# Patient Record
Sex: FEMALE | Race: WHITE | NOT HISPANIC OR LATINO | Employment: FULL TIME | URBAN - NONMETROPOLITAN AREA
[De-identification: names, ages, dates, MRNs, and addresses within clinical notes are randomized per-mention and may not be internally consistent; named-entity substitution may affect disease eponyms.]

---

## 2022-06-03 ENCOUNTER — LAB (OUTPATIENT)
Dept: LAB | Facility: HOSPITAL | Age: 42
End: 2022-06-03

## 2022-06-03 ENCOUNTER — OFFICE VISIT (OUTPATIENT)
Dept: GASTROENTEROLOGY | Facility: CLINIC | Age: 42
End: 2022-06-03

## 2022-06-03 VITALS
SYSTOLIC BLOOD PRESSURE: 127 MMHG | HEART RATE: 80 BPM | BODY MASS INDEX: 24.8 KG/M2 | HEIGHT: 67 IN | WEIGHT: 158 LBS | OXYGEN SATURATION: 98 % | DIASTOLIC BLOOD PRESSURE: 79 MMHG

## 2022-06-03 DIAGNOSIS — K21.9 GASTROESOPHAGEAL REFLUX DISEASE WITHOUT ESOPHAGITIS: ICD-10-CM

## 2022-06-03 DIAGNOSIS — R11.2 NAUSEA AND VOMITING, UNSPECIFIED VOMITING TYPE: ICD-10-CM

## 2022-06-03 DIAGNOSIS — R19.7 DIARRHEA, UNSPECIFIED TYPE: ICD-10-CM

## 2022-06-03 DIAGNOSIS — K21.9 GASTROESOPHAGEAL REFLUX DISEASE WITHOUT ESOPHAGITIS: Primary | ICD-10-CM

## 2022-06-03 DIAGNOSIS — R10.84 GENERALIZED ABDOMINAL PAIN: Primary | ICD-10-CM

## 2022-06-03 DIAGNOSIS — R10.84 GENERALIZED ABDOMINAL PAIN: ICD-10-CM

## 2022-06-03 LAB — SARS-COV-2 RNA PNL SPEC NAA+PROBE: NOT DETECTED

## 2022-06-03 PROCEDURE — 99204 OFFICE O/P NEW MOD 45 MIN: CPT | Performed by: PHYSICIAN ASSISTANT

## 2022-06-03 PROCEDURE — U0004 COV-19 TEST NON-CDC HGH THRU: HCPCS

## 2022-06-03 RX ORDER — BISACODYL 5 MG/1
20 TABLET, DELAYED RELEASE ORAL ONCE
Qty: 4 TABLET | Refills: 0 | Status: SHIPPED | OUTPATIENT
Start: 2022-06-03 | End: 2022-06-03

## 2022-06-03 RX ORDER — OMEPRAZOLE 40 MG/1
40 CAPSULE, DELAYED RELEASE ORAL 2 TIMES DAILY
Qty: 180 CAPSULE | Refills: 0 | Status: SHIPPED | OUTPATIENT
Start: 2022-06-03 | End: 2022-06-27

## 2022-06-03 RX ORDER — CIPROFLOXACIN 500 MG/1
500 TABLET, FILM COATED ORAL 2 TIMES DAILY
COMMUNITY
End: 2022-06-03

## 2022-06-03 RX ORDER — OMEPRAZOLE 20 MG/1
20 CAPSULE, DELAYED RELEASE ORAL DAILY
COMMUNITY
End: 2022-06-03

## 2022-06-03 RX ORDER — MAGNESIUM CARB/ALUMINUM HYDROX 105-160MG
296 TABLET,CHEWABLE ORAL TAKE AS DIRECTED
Qty: 592 ML | Refills: 0 | Status: SHIPPED | OUTPATIENT
Start: 2022-06-03 | End: 2022-06-07 | Stop reason: HOSPADM

## 2022-06-03 RX ORDER — DULOXETIN HYDROCHLORIDE 60 MG/1
60 CAPSULE, DELAYED RELEASE ORAL DAILY
COMMUNITY

## 2022-06-03 RX ORDER — METRONIDAZOLE 500 MG/1
500 TABLET ORAL 3 TIMES DAILY
COMMUNITY
End: 2022-06-06

## 2022-06-03 NOTE — H&P (VIEW-ONLY)
Chief Complaint   Patient presents with   • Constipation   • Abdominal Pain       Brea Fitzpatrick is a 42 y.o. female who presents to the office today for evaluation of Constipation and Abdominal Pain  .    HPI  Patient presents the clinic today for evaluation of constipation and abdominal pain.  Patient states that she has been experiencing the symptoms for roughly the past 1 and half months.  She was seen in the ER at [facility] for what originally was believed to be a ruptured ovarian cyst due to the severe pain in the right lower quadrant.  They obtained a CT which showed inflammation in the small bowels.  They discharged her home with no medications.  Within a few days she was back in the ER due to the severe pain and was later prescribed a regimen of Cipro and Flagyl in which she has completed.  Patient stated that the ER told her that more than likely it was a viral infection that should clear on its own however antibiotics may help with treatment.  She was experiencing at this time generalized tenderness throughout the abdomen.  Patient states that she is also been noticing that every time she eats she seems to have a lot of abdominal bloating.  She experiences a tight sensation in the left upper quadrant as well as belching.  She is currently taking omeprazole 20 mg which does not seem to be helping.  Patient does have her gallbladder removed.  She does take ibuprofen, metronidazole and doxycycline currently.  The ER did give her Bentyl 20 mg which caused severe constipation.  She has since discontinued Bentyl use and is back to normal bowel movements which is mainly diarrhea.  Patient has had 2 colonoscopies performed in the past both of which were considered normal.  Her last one was roughly 2 years ago.  Patient does admit to having a history of stomach ulcers which symptoms currently do seem to be similar to what she is experienced in the past.  Review of Systems   Constitutional:  Positive for activity change and fatigue. Negative for unexpected weight change.   HENT: Negative for sore throat and trouble swallowing.    Eyes: Negative.    Respiratory: Negative for chest tightness.    Cardiovascular: Negative for chest pain.   Gastrointestinal: Positive for abdominal distention, abdominal pain, constipation, diarrhea and nausea. Negative for anal bleeding, blood in stool, rectal pain and vomiting.   Endocrine: Negative.    Genitourinary: Negative for difficulty urinating.   Musculoskeletal: Positive for back pain. Negative for neck pain.   Skin: Negative.    Allergic/Immunologic: Negative for environmental allergies and food allergies.   Neurological: Positive for dizziness. Negative for headaches.   Hematological: Bruises/bleeds easily.   Psychiatric/Behavioral: Positive for agitation and sleep disturbance. The patient is nervous/anxious.        ACTIVE PROBLEMS:   Specialty Problems    None         PAST MEDICAL HISTORY:  History reviewed. No pertinent past medical history.    SURGICAL HISTORY:  Past Surgical History:   Procedure Laterality Date   • COLONOSCOPY     • UPPER GASTROINTESTINAL ENDOSCOPY         FAMILY HISTORY:  Family History   Problem Relation Age of Onset   • Crohn's disease Father        SOCIAL HISTORY:  Social History     Tobacco Use   • Smoking status: Current Every Day Smoker   • Smokeless tobacco: Never Used   Substance Use Topics   • Alcohol use: Not on file       CURRENT MEDICATION:    Current Outpatient Medications:   •  DULoxetine (CYMBALTA) 60 MG capsule, Take 60 mg by mouth Daily., Disp: , Rfl:   •  metroNIDAZOLE (FLAGYL) 500 MG tablet, Take 500 mg by mouth 3 (Three) Times a Day., Disp: , Rfl:   •  magnesium citrate 1.745 GM/30ML solution solution, Take 296 mL by mouth Take As Directed for 2 doses. Take one full bottle at 4:00 PM and take second bottle at 10:00 PM., Disp: 592 mL, Rfl: 0  •  omeprazole (priLOSEC) 40 MG capsule, Take 1 capsule by mouth 2 (Two) Times a  "Day. Take one capsule 30 minutes prior to eating breakfast, Disp: 180 capsule, Rfl: 0    ALLERGIES:  Patient has no known allergies.    VISIT VITALS:  /79   Pulse 80   Ht 170.2 cm (67\")   Wt 71.7 kg (158 lb)   SpO2 98%   BMI 24.75 kg/m²   Physical Exam  Constitutional:       General: She is not in acute distress.     Appearance: Normal appearance. She is well-developed.   HENT:      Head: Normocephalic and atraumatic.   Eyes:      Pupils: Pupils are equal, round, and reactive to light.   Cardiovascular:      Rate and Rhythm: Normal rate and regular rhythm.      Heart sounds: Normal heart sounds.   Pulmonary:      Effort: Pulmonary effort is normal. No respiratory distress.      Breath sounds: Normal breath sounds. No wheezing, rhonchi or rales.   Abdominal:      General: Abdomen is flat. Bowel sounds are normal. There is no distension.      Palpations: Abdomen is soft. There is no mass.      Tenderness: There is abdominal tenderness (Epigastrium). There is no guarding or rebound.      Hernia: No hernia is present.   Musculoskeletal:         General: No swelling. Normal range of motion.      Cervical back: Normal range of motion and neck supple.      Right lower leg: No edema.      Left lower leg: No edema.   Skin:     General: Skin is warm and dry.   Neurological:      Mental Status: She is alert and oriented to person, place, and time.   Psychiatric:         Attention and Perception: Attention normal.         Mood and Affect: Mood normal.         Speech: Speech normal.         Behavior: Behavior normal. Behavior is cooperative.         Thought Content: Thought content normal.         Assessment    Due to the patient's symptoms-I will go ahead and get her started on omeprazole 40 mg twice daily dosing for heartburn/reflux, nausea and epigastric pain.  I will also go ahead and get her scheduled for an EGD/colonoscopy with Dr. Dumont.  We will be using a magnesium citrate Dulcolax colon prep.  Both " procedures thoroughly explained to the patient she voiced understanding and agreement to the treatment plan.   Diagnosis Plan   1. Gastroesophageal reflux disease without esophagitis  omeprazole (priLOSEC) 40 MG capsule    bisacodyl (DULCOLAX) 5 MG EC tablet    magnesium citrate 1.745 GM/30ML solution solution    Case Request    Case Request   2. Diarrhea, unspecified type  bisacodyl (DULCOLAX) 5 MG EC tablet    magnesium citrate 1.745 GM/30ML solution solution    Case Request    Case Request   3. Generalized abdominal pain  bisacodyl (DULCOLAX) 5 MG EC tablet    magnesium citrate 1.745 GM/30ML solution solution    Case Request    Case Request   4. Nausea and vomiting, unspecified vomiting type  bisacodyl (DULCOLAX) 5 MG EC tablet    magnesium citrate 1.745 GM/30ML solution solution    Case Request    Case Request       Return After procedure.                   This document has been electronically signed by Shasta Esquivel PA-C  June 5, 2022 20:29 EDT    Part of this note may be an electronic transcription/translation of spoken language to printed text using the Dragon Dictation System.

## 2022-06-03 NOTE — PROGRESS NOTES
Chief Complaint   Patient presents with   • Constipation   • Abdominal Pain       Brea Fitzpatrick is a 42 y.o. female who presents to the office today for evaluation of Constipation and Abdominal Pain  .    HPI  Patient presents the clinic today for evaluation of constipation and abdominal pain.  Patient states that she has been experiencing the symptoms for roughly the past 1 and half months.  She was seen in the ER at [facility] for what originally was believed to be a ruptured ovarian cyst due to the severe pain in the right lower quadrant.  They obtained a CT which showed inflammation in the small bowels.  They discharged her home with no medications.  Within a few days she was back in the ER due to the severe pain and was later prescribed a regimen of Cipro and Flagyl in which she has completed.  Patient stated that the ER told her that more than likely it was a viral infection that should clear on its own however antibiotics may help with treatment.  She was experiencing at this time generalized tenderness throughout the abdomen.  Patient states that she is also been noticing that every time she eats she seems to have a lot of abdominal bloating.  She experiences a tight sensation in the left upper quadrant as well as belching.  She is currently taking omeprazole 20 mg which does not seem to be helping.  Patient does have her gallbladder removed.  She does take ibuprofen, metronidazole and doxycycline currently.  The ER did give her Bentyl 20 mg which caused severe constipation.  She has since discontinued Bentyl use and is back to normal bowel movements which is mainly diarrhea.  Patient has had 2 colonoscopies performed in the past both of which were considered normal.  Her last one was roughly 2 years ago.  Patient does admit to having a history of stomach ulcers which symptoms currently do seem to be similar to what she is experienced in the past.  Review of Systems   Constitutional:  Positive for activity change and fatigue. Negative for unexpected weight change.   HENT: Negative for sore throat and trouble swallowing.    Eyes: Negative.    Respiratory: Negative for chest tightness.    Cardiovascular: Negative for chest pain.   Gastrointestinal: Positive for abdominal distention, abdominal pain, constipation, diarrhea and nausea. Negative for anal bleeding, blood in stool, rectal pain and vomiting.   Endocrine: Negative.    Genitourinary: Negative for difficulty urinating.   Musculoskeletal: Positive for back pain. Negative for neck pain.   Skin: Negative.    Allergic/Immunologic: Negative for environmental allergies and food allergies.   Neurological: Positive for dizziness. Negative for headaches.   Hematological: Bruises/bleeds easily.   Psychiatric/Behavioral: Positive for agitation and sleep disturbance. The patient is nervous/anxious.        ACTIVE PROBLEMS:   Specialty Problems    None         PAST MEDICAL HISTORY:  History reviewed. No pertinent past medical history.    SURGICAL HISTORY:  Past Surgical History:   Procedure Laterality Date   • COLONOSCOPY     • UPPER GASTROINTESTINAL ENDOSCOPY         FAMILY HISTORY:  Family History   Problem Relation Age of Onset   • Crohn's disease Father        SOCIAL HISTORY:  Social History     Tobacco Use   • Smoking status: Current Every Day Smoker   • Smokeless tobacco: Never Used   Substance Use Topics   • Alcohol use: Not on file       CURRENT MEDICATION:    Current Outpatient Medications:   •  DULoxetine (CYMBALTA) 60 MG capsule, Take 60 mg by mouth Daily., Disp: , Rfl:   •  metroNIDAZOLE (FLAGYL) 500 MG tablet, Take 500 mg by mouth 3 (Three) Times a Day., Disp: , Rfl:   •  magnesium citrate 1.745 GM/30ML solution solution, Take 296 mL by mouth Take As Directed for 2 doses. Take one full bottle at 4:00 PM and take second bottle at 10:00 PM., Disp: 592 mL, Rfl: 0  •  omeprazole (priLOSEC) 40 MG capsule, Take 1 capsule by mouth 2 (Two) Times a  "Day. Take one capsule 30 minutes prior to eating breakfast, Disp: 180 capsule, Rfl: 0    ALLERGIES:  Patient has no known allergies.    VISIT VITALS:  /79   Pulse 80   Ht 170.2 cm (67\")   Wt 71.7 kg (158 lb)   SpO2 98%   BMI 24.75 kg/m²   Physical Exam  Constitutional:       General: She is not in acute distress.     Appearance: Normal appearance. She is well-developed.   HENT:      Head: Normocephalic and atraumatic.   Eyes:      Pupils: Pupils are equal, round, and reactive to light.   Cardiovascular:      Rate and Rhythm: Normal rate and regular rhythm.      Heart sounds: Normal heart sounds.   Pulmonary:      Effort: Pulmonary effort is normal. No respiratory distress.      Breath sounds: Normal breath sounds. No wheezing, rhonchi or rales.   Abdominal:      General: Abdomen is flat. Bowel sounds are normal. There is no distension.      Palpations: Abdomen is soft. There is no mass.      Tenderness: There is abdominal tenderness (Epigastrium). There is no guarding or rebound.      Hernia: No hernia is present.   Musculoskeletal:         General: No swelling. Normal range of motion.      Cervical back: Normal range of motion and neck supple.      Right lower leg: No edema.      Left lower leg: No edema.   Skin:     General: Skin is warm and dry.   Neurological:      Mental Status: She is alert and oriented to person, place, and time.   Psychiatric:         Attention and Perception: Attention normal.         Mood and Affect: Mood normal.         Speech: Speech normal.         Behavior: Behavior normal. Behavior is cooperative.         Thought Content: Thought content normal.         Assessment    Due to the patient's symptoms-I will go ahead and get her started on omeprazole 40 mg twice daily dosing for heartburn/reflux, nausea and epigastric pain.  I will also go ahead and get her scheduled for an EGD/colonoscopy with Dr. Dumont.  We will be using a magnesium citrate Dulcolax colon prep.  Both " procedures thoroughly explained to the patient she voiced understanding and agreement to the treatment plan.   Diagnosis Plan   1. Gastroesophageal reflux disease without esophagitis  omeprazole (priLOSEC) 40 MG capsule    bisacodyl (DULCOLAX) 5 MG EC tablet    magnesium citrate 1.745 GM/30ML solution solution    Case Request    Case Request   2. Diarrhea, unspecified type  bisacodyl (DULCOLAX) 5 MG EC tablet    magnesium citrate 1.745 GM/30ML solution solution    Case Request    Case Request   3. Generalized abdominal pain  bisacodyl (DULCOLAX) 5 MG EC tablet    magnesium citrate 1.745 GM/30ML solution solution    Case Request    Case Request   4. Nausea and vomiting, unspecified vomiting type  bisacodyl (DULCOLAX) 5 MG EC tablet    magnesium citrate 1.745 GM/30ML solution solution    Case Request    Case Request       Return After procedure.                   This document has been electronically signed by Shasta Esquivel PA-C  June 5, 2022 20:29 EDT    Part of this note may be an electronic transcription/translation of spoken language to printed text using the Dragon Dictation System.

## 2022-06-06 RX ORDER — AMITRIPTYLINE HYDROCHLORIDE 100 MG/1
100 TABLET, FILM COATED ORAL NIGHTLY
COMMUNITY

## 2022-06-07 ENCOUNTER — HOSPITAL ENCOUNTER (OUTPATIENT)
Facility: HOSPITAL | Age: 42
Setting detail: HOSPITAL OUTPATIENT SURGERY
Discharge: HOME | End: 2022-06-07
Attending: INTERNAL MEDICINE | Admitting: INTERNAL MEDICINE

## 2022-06-07 ENCOUNTER — ANESTHESIA EVENT (OUTPATIENT)
Dept: PERIOP | Facility: HOSPITAL | Age: 42
End: 2022-06-07

## 2022-06-07 ENCOUNTER — ANESTHESIA (OUTPATIENT)
Dept: PERIOP | Facility: HOSPITAL | Age: 42
End: 2022-06-07

## 2022-06-07 VITALS
DIASTOLIC BLOOD PRESSURE: 88 MMHG | TEMPERATURE: 98.4 F | SYSTOLIC BLOOD PRESSURE: 133 MMHG | RESPIRATION RATE: 18 BRPM | OXYGEN SATURATION: 100 % | HEIGHT: 67 IN | HEART RATE: 116 BPM | BODY MASS INDEX: 24.01 KG/M2 | WEIGHT: 153 LBS

## 2022-06-07 DIAGNOSIS — R11.2 NAUSEA AND VOMITING, UNSPECIFIED VOMITING TYPE: Primary | ICD-10-CM

## 2022-06-07 DIAGNOSIS — R19.7 DIARRHEA, UNSPECIFIED TYPE: ICD-10-CM

## 2022-06-07 DIAGNOSIS — R10.84 GENERALIZED ABDOMINAL PAIN: ICD-10-CM

## 2022-06-07 DIAGNOSIS — K21.9 GASTROESOPHAGEAL REFLUX DISEASE WITHOUT ESOPHAGITIS: ICD-10-CM

## 2022-06-07 LAB
B-HCG UR QL: NEGATIVE
EXPIRATION DATE: NORMAL
INTERNAL NEGATIVE CONTROL: NORMAL
INTERNAL POSITIVE CONTROL: NORMAL
Lab: NORMAL

## 2022-06-07 PROCEDURE — 43239 EGD BIOPSY SINGLE/MULTIPLE: CPT | Performed by: INTERNAL MEDICINE

## 2022-06-07 PROCEDURE — 25010000002 PROPOFOL 10 MG/ML EMULSION: Performed by: NURSE ANESTHETIST, CERTIFIED REGISTERED

## 2022-06-07 PROCEDURE — 45385 COLONOSCOPY W/LESION REMOVAL: CPT | Performed by: INTERNAL MEDICINE

## 2022-06-07 PROCEDURE — 81025 URINE PREGNANCY TEST: CPT | Performed by: INTERNAL MEDICINE

## 2022-06-07 RX ORDER — FENTANYL CITRATE 50 UG/ML
50 INJECTION, SOLUTION INTRAMUSCULAR; INTRAVENOUS
Status: DISCONTINUED | OUTPATIENT
Start: 2022-06-07 | End: 2022-06-07 | Stop reason: HOSPADM

## 2022-06-07 RX ORDER — MEPERIDINE HYDROCHLORIDE 25 MG/ML
12.5 INJECTION INTRAMUSCULAR; INTRAVENOUS; SUBCUTANEOUS
Status: DISCONTINUED | OUTPATIENT
Start: 2022-06-07 | End: 2022-06-07 | Stop reason: HOSPADM

## 2022-06-07 RX ORDER — LIDOCAINE HYDROCHLORIDE 20 MG/ML
INJECTION, SOLUTION INFILTRATION; PERINEURAL AS NEEDED
Status: DISCONTINUED | OUTPATIENT
Start: 2022-06-07 | End: 2022-06-07 | Stop reason: SURG

## 2022-06-07 RX ORDER — IPRATROPIUM BROMIDE AND ALBUTEROL SULFATE 2.5; .5 MG/3ML; MG/3ML
3 SOLUTION RESPIRATORY (INHALATION) ONCE AS NEEDED
Status: DISCONTINUED | OUTPATIENT
Start: 2022-06-07 | End: 2022-06-07 | Stop reason: HOSPADM

## 2022-06-07 RX ORDER — SODIUM CHLORIDE, SODIUM LACTATE, POTASSIUM CHLORIDE, CALCIUM CHLORIDE 600; 310; 30; 20 MG/100ML; MG/100ML; MG/100ML; MG/100ML
100 INJECTION, SOLUTION INTRAVENOUS ONCE AS NEEDED
Status: DISCONTINUED | OUTPATIENT
Start: 2022-06-07 | End: 2022-06-07 | Stop reason: HOSPADM

## 2022-06-07 RX ORDER — KETOROLAC TROMETHAMINE 30 MG/ML
30 INJECTION, SOLUTION INTRAMUSCULAR; INTRAVENOUS EVERY 6 HOURS PRN
Status: DISCONTINUED | OUTPATIENT
Start: 2022-06-07 | End: 2022-06-07 | Stop reason: HOSPADM

## 2022-06-07 RX ORDER — MIDAZOLAM HYDROCHLORIDE 1 MG/ML
1 INJECTION INTRAMUSCULAR; INTRAVENOUS
Status: DISCONTINUED | OUTPATIENT
Start: 2022-06-07 | End: 2022-06-07 | Stop reason: HOSPADM

## 2022-06-07 RX ORDER — SODIUM CHLORIDE 0.9 % (FLUSH) 0.9 %
10 SYRINGE (ML) INJECTION EVERY 12 HOURS SCHEDULED
Status: DISCONTINUED | OUTPATIENT
Start: 2022-06-07 | End: 2022-06-07 | Stop reason: HOSPADM

## 2022-06-07 RX ORDER — PROPOFOL 10 MG/ML
VIAL (ML) INTRAVENOUS AS NEEDED
Status: DISCONTINUED | OUTPATIENT
Start: 2022-06-07 | End: 2022-06-07 | Stop reason: SURG

## 2022-06-07 RX ORDER — OXYCODONE HYDROCHLORIDE AND ACETAMINOPHEN 5; 325 MG/1; MG/1
1 TABLET ORAL ONCE AS NEEDED
Status: DISCONTINUED | OUTPATIENT
Start: 2022-06-07 | End: 2022-06-07 | Stop reason: HOSPADM

## 2022-06-07 RX ORDER — SODIUM CHLORIDE 0.9 % (FLUSH) 0.9 %
10 SYRINGE (ML) INJECTION AS NEEDED
Status: DISCONTINUED | OUTPATIENT
Start: 2022-06-07 | End: 2022-06-07 | Stop reason: HOSPADM

## 2022-06-07 RX ORDER — SODIUM CHLORIDE, SODIUM LACTATE, POTASSIUM CHLORIDE, CALCIUM CHLORIDE 600; 310; 30; 20 MG/100ML; MG/100ML; MG/100ML; MG/100ML
125 INJECTION, SOLUTION INTRAVENOUS ONCE
Status: DISCONTINUED | OUTPATIENT
Start: 2022-06-07 | End: 2022-06-07 | Stop reason: HOSPADM

## 2022-06-07 RX ORDER — CALCIUM POLYCARBOPHIL 625 MG
1250 TABLET ORAL DAILY
Qty: 60 TABLET | Refills: 10 | Status: SHIPPED | OUTPATIENT
Start: 2022-06-07

## 2022-06-07 RX ORDER — ONDANSETRON 2 MG/ML
4 INJECTION INTRAMUSCULAR; INTRAVENOUS AS NEEDED
Status: DISCONTINUED | OUTPATIENT
Start: 2022-06-07 | End: 2022-06-07 | Stop reason: HOSPADM

## 2022-06-07 RX ORDER — SODIUM CHLORIDE, SODIUM LACTATE, POTASSIUM CHLORIDE, CALCIUM CHLORIDE 600; 310; 30; 20 MG/100ML; MG/100ML; MG/100ML; MG/100ML
INJECTION, SOLUTION INTRAVENOUS CONTINUOUS PRN
Status: DISCONTINUED | OUTPATIENT
Start: 2022-06-07 | End: 2022-06-07 | Stop reason: SURG

## 2022-06-07 RX ADMIN — PROPOFOL 100 MG: 10 INJECTION, EMULSION INTRAVENOUS at 13:06

## 2022-06-07 RX ADMIN — PROPOFOL 75 MCG/KG/MIN: 10 INJECTION, EMULSION INTRAVENOUS at 12:42

## 2022-06-07 RX ADMIN — PROPOFOL 100 MG: 10 INJECTION, EMULSION INTRAVENOUS at 12:57

## 2022-06-07 RX ADMIN — LIDOCAINE HYDROCHLORIDE 80 MG: 20 INJECTION, SOLUTION INFILTRATION; PERINEURAL at 12:42

## 2022-06-07 RX ADMIN — SODIUM CHLORIDE, POTASSIUM CHLORIDE, SODIUM LACTATE AND CALCIUM CHLORIDE: 600; 310; 30; 20 INJECTION, SOLUTION INTRAVENOUS at 12:38

## 2022-06-07 RX ADMIN — PROPOFOL 100 MG: 10 INJECTION, EMULSION INTRAVENOUS at 12:42

## 2022-06-07 NOTE — ANESTHESIA POSTPROCEDURE EVALUATION
Patient: Brea Fitzpatrick    Procedure Summary     Date: 06/07/22 Room / Location: [facility] OR  /  [facility] OR    Anesthesia Start: 1238 Anesthesia Stop: 1309    Procedures:       ESOPHAGOGASTRODUODENOSCOPY WITH BIOPSY (N/A Esophagus)      COLONOSCOPY FOR SCREENING (N/A ) Diagnosis:       Gastroesophageal reflux disease without esophagitis      Diarrhea, unspecified type      Generalized abdominal pain      Nausea and vomiting, unspecified vomiting type      (Gastroesophageal reflux disease without esophagitis [K21.9])      (Diarrhea, unspecified type [R19.7])      (Generalized abdominal pain [R10.84])      (Nausea and vomiting, unspecified vomiting type [R11.2])    Surgeons: Jo Lamb MD Provider: Bharat Mcclain MD    Anesthesia Type: general ASA Status: 2          Anesthesia Type: general    Vitals  Vitals Value Taken Time   /88 06/07/22 1344   Temp 98.4 °F (36.9 °C) 06/07/22 1314   Pulse 116 06/07/22 1344   Resp 18 06/07/22 1344   SpO2 100 % 06/07/22 1344           Post Anesthesia Care and Evaluation    Patient location during evaluation: PACU  Patient participation: complete - patient participated  Level of consciousness: awake  Pain score: 0  Pain management: adequate    Airway patency: patent  Anesthetic complications: No anesthetic complications  PONV Status: controlled  Cardiovascular status: acceptable and blood pressure returned to baseline  Respiratory status: acceptable and room air  Hydration status: acceptable    Comments: Patient comfortable with discharge at this time.

## 2022-06-07 NOTE — ANESTHESIA PREPROCEDURE EVALUATION
Anesthesia Evaluation     Patient summary reviewed and Nursing notes reviewed   no history of anesthetic complications:  NPO Solid Status: > 8 hours  NPO Liquid Status: > 8 hours           Airway   Mallampati: II  TM distance: >3 FB  Neck ROM: full  No difficulty expected  Dental - normal exam     Pulmonary - normal exam    breath sounds clear to auscultation  (+) a smoker Current Smoked day of surgery,   (-) asthma  Cardiovascular - negative cardio ROS and normal exam    Rhythm: regular  Rate: normal    (-) past MI      Neuro/Psych  (+) seizures (Childhood until age 14), psychiatric history Anxiety and Depression,    (-) CVA  GI/Hepatic/Renal/Endo    (+)  GERD,      Musculoskeletal (-) negative ROS    Abdominal  - normal exam   Substance History - negative use     OB/GYN negative ob/gyn ROS         Other - negative ROS                     Anesthesia Plan    ASA 2     general   total IV anesthesia  intravenous induction     Anesthetic plan, risks, benefits, and alternatives have been provided, discussed and informed consent has been obtained with: patient.        CODE STATUS:

## 2022-06-10 LAB — REF LAB TEST METHOD: NORMAL

## 2022-06-13 NOTE — PROGRESS NOTES
Recently, you underwent EGD and colonoscopy.  Polyps were removed from the colon.  The polyps were completely benign.  You will need repeat colonoscopy in 10 years for screening purposes.  Biopsies of your esophagus revealed mild chronic esophagitis.  Biopsies of the stomach were negative for H. pylori.  Biopsies of the small bowel were negative for celiac disease.  Please continue omeprazole for esophagitis.  I would like for you to also undergo gastric emptying scan.  Please keep your follow-up appointment.

## 2022-06-21 ENCOUNTER — HOSPITAL ENCOUNTER (OUTPATIENT)
Dept: NUCLEAR MEDICINE | Facility: HOSPITAL | Age: 42
Discharge: HOME | End: 2022-06-21

## 2022-06-21 DIAGNOSIS — R11.2 NAUSEA AND VOMITING, UNSPECIFIED VOMITING TYPE: ICD-10-CM

## 2022-06-21 PROCEDURE — A9541 TC99M SULFUR COLLOID: HCPCS | Performed by: INTERNAL MEDICINE

## 2022-06-21 PROCEDURE — 78264 GASTRIC EMPTYING IMG STUDY: CPT

## 2022-06-21 PROCEDURE — 0 TECHNETIUM SULFUR COLLOID: Performed by: INTERNAL MEDICINE

## 2022-06-21 PROCEDURE — 78264 GASTRIC EMPTYING IMG STUDY: CPT | Performed by: RADIOLOGY

## 2022-06-21 RX ADMIN — TECHNETIUM TC 99M SULFUR COLLOID 1 DOSE: KIT at 12:20

## 2022-06-21 NOTE — PROGRESS NOTES
Recently, you underwent a gastric emptying scan.  This test revealed normal emptying of the stomach.  Please keep your follow-up appointment.

## 2022-06-24 ENCOUNTER — TELEPHONE (OUTPATIENT)
Dept: GASTROENTEROLOGY | Facility: CLINIC | Age: 42
End: 2022-06-24

## 2022-06-24 NOTE — TELEPHONE ENCOUNTER
Patient had colonoscopy and was told he has a hiatal hernia. She contacted general surgery but they are booking out pretty far. She contacted Dr Obando's office in [place], phone [phone]. They are able to get her in on 06/30/22, but they need a referral. Patient asking if we can refer her there.

## 2022-06-27 ENCOUNTER — OFFICE VISIT (OUTPATIENT)
Dept: GASTROENTEROLOGY | Facility: CLINIC | Age: 42
End: 2022-06-27

## 2022-06-27 VITALS
SYSTOLIC BLOOD PRESSURE: 117 MMHG | WEIGHT: 154 LBS | HEART RATE: 94 BPM | OXYGEN SATURATION: 97 % | BODY MASS INDEX: 24.17 KG/M2 | DIASTOLIC BLOOD PRESSURE: 83 MMHG | HEIGHT: 67 IN

## 2022-06-27 DIAGNOSIS — K21.9 GASTROESOPHAGEAL REFLUX DISEASE WITHOUT ESOPHAGITIS: ICD-10-CM

## 2022-06-27 DIAGNOSIS — R19.7 DIARRHEA, UNSPECIFIED TYPE: ICD-10-CM

## 2022-06-27 DIAGNOSIS — R10.84 GENERALIZED ABDOMINAL PAIN: Primary | ICD-10-CM

## 2022-06-27 PROCEDURE — 99214 OFFICE O/P EST MOD 30 MIN: CPT | Performed by: PHYSICIAN ASSISTANT

## 2022-06-27 RX ORDER — LANSOPRAZOLE 30 MG/1
30 CAPSULE, DELAYED RELEASE ORAL 2 TIMES DAILY
Qty: 180 CAPSULE | Refills: 3 | Status: SHIPPED | OUTPATIENT
Start: 2022-06-27 | End: 2022-11-07

## 2022-06-27 NOTE — PROGRESS NOTES
Chief Complaint   Patient presents with   • Heartburn   • Abdominal Pain   • Nausea       Brea Fitzpatrick is a 42 y.o. female who presents to the office today for evaluation of Heartburn, Abdominal Pain, and Nausea  .    HPI  Patient presents to the clinic today for follow-up of heartburn/reflux, abdominal pain and nausea.  She recently underwent EGD/colonoscopy with Dr. Dumont on 6/7.  Patient states that she has been doing fairly well since having the procedure performed.  Dr. Dumont did schedule her to also have a gastric emptying scan which came back normal.  Patient states that she has continued having the diarrhea and denies any constipation.  She states that even if she is having diarrhea she has had some difficulty passing any flatulence.  The color is normal and has been improving over the last several days.  Dr. Marroquin did start her on FiberCon 625 mg twice daily dosing.  Patient states the pills were so large she was not able to take them longer for stopping them that she did not notice any difference in stools.  The one thing she did notice was an increase in abdominal bloating.  She goes this coming Thursday to meet with a general surgeon at [facility] for potential repair of her hiatal hernia that she believes is causing her symptoms.  EGD results  - The examined esophagus was normal. Biopsies were taken with a cold forceps for histology.  - A small hiatal hernia was present.  - Patchy mildly erythematous mucosa without bleeding was found in the gastric antrum. Biopsies were taken with a cold  forceps for histology.  - The examined duodenum was normal. Biopsies were taken with a cold forceps for histology.  Colonoscopy results  - The perianal and digital rectal examinations were normal.  - A 4 mm polyp was found in the cecum. The polyp was sessile. The polyp was removed with a cold snare. Resection  and retrieval were complete.  - Two sessile polyps were found in the sigmoid  colon. The polyps were 5 to 6 mm in size. These polyps were removed  with a cold snare. Resection and retrieval were complete.  - Internal hemorrhoids were found during retroflexion. The hemorrhoids were Grade I (internal hemorrhoids that do not  prolapse).  - The terminal ileum appeared normal.  Review of Systems   Constitutional: Positive for activity change and fatigue. Negative for unexpected weight change.   HENT: Negative for sore throat and trouble swallowing.    Eyes: Negative.    Respiratory: Negative for chest tightness.    Cardiovascular: Negative for chest pain.   Gastrointestinal: Positive for abdominal distention, abdominal pain, constipation, diarrhea and nausea. Negative for anal bleeding, blood in stool, rectal pain and vomiting.   Endocrine: Negative.    Genitourinary: Negative for difficulty urinating.   Musculoskeletal: Positive for back pain. Negative for neck pain.   Skin: Negative.    Allergic/Immunologic: Negative for environmental allergies and food allergies.   Neurological: Positive for dizziness. Negative for headaches.   Hematological: Bruises/bleeds easily.   Psychiatric/Behavioral: Positive for agitation and sleep disturbance. The patient is nervous/anxious.        ACTIVE PROBLEMS:   Specialty Problems        Gastroenterology Problems    Gastroesophageal reflux disease without esophagitis        Nausea and vomiting              PAST MEDICAL HISTORY:  Past Medical History:   Diagnosis Date   • Fibromyalgia    • GERD (gastroesophageal reflux disease)    • IBS (irritable bowel syndrome)        SURGICAL HISTORY:  Past Surgical History:   Procedure Laterality Date   • APPENDECTOMY     • COLONOSCOPY     • COLONOSCOPY N/A 6/7/2022    Procedure: COLONOSCOPY FOR SCREENING;  Surgeon: Jo Lamb MD;  Location: [facility];  Service: Gastroenterology;  Laterality: N/A;   • ENDOSCOPY N/A 6/7/2022    Procedure: ESOPHAGOGASTRODUODENOSCOPY WITH BIOPSY;  Surgeon: Jo Lamb  "MD GURDEEP;  Location: [facility];  Service: Gastroenterology;  Laterality: N/A;   • ROTATOR CUFF REPAIR Right    • TUBAL ABDOMINAL LIGATION     • UPPER GASTROINTESTINAL ENDOSCOPY         FAMILY HISTORY:  Family History   Problem Relation Age of Onset   • Crohn's disease Father        SOCIAL HISTORY:  Social History     Tobacco Use   • Smoking status: Current Every Day Smoker     Packs/day: 0.50     Years: 22.00     Pack years: 11.00     Types: Cigarettes   • Smokeless tobacco: Never Used   Substance Use Topics   • Alcohol use: Never       CURRENT MEDICATION:    Current Outpatient Medications:   •  amitriptyline (ELAVIL) 100 MG tablet, Take 100 mg by mouth Every Night., Disp: , Rfl:   •  calcium polycarbophil (FiberCon) 625 MG tablet, Take 2 tablets by mouth Daily., Disp: 60 tablet, Rfl: 10  •  DULoxetine (CYMBALTA) 60 MG capsule, Take 60 mg by mouth Daily., Disp: , Rfl:   •  lansoprazole (PREVACID) 30 MG capsule, Take 1 capsule by mouth 2 (Two) Times a Day., Disp: 180 capsule, Rfl: 3    ALLERGIES:  Patient has no known allergies.    VISIT VITALS:  /83   Pulse 94   Ht 170.2 cm (67\")   Wt 69.9 kg (154 lb)   LMP 05/30/2022 (Exact Date)   SpO2 97%   BMI 24.12 kg/m²   Physical Exam  Constitutional:       General: She is not in acute distress.     Appearance: Normal appearance. She is well-developed.   HENT:      Head: Normocephalic and atraumatic.   Eyes:      Pupils: Pupils are equal, round, and reactive to light.   Cardiovascular:      Rate and Rhythm: Normal rate and regular rhythm.      Heart sounds: Normal heart sounds.   Pulmonary:      Effort: Pulmonary effort is normal. No respiratory distress.      Breath sounds: Normal breath sounds. No wheezing, rhonchi or rales.   Abdominal:      General: Abdomen is flat. Bowel sounds are normal. There is no distension.      Palpations: Abdomen is soft. There is no mass.      Tenderness: There is abdominal tenderness. There is no guarding or rebound.      Hernia: No " hernia is present.   Musculoskeletal:         General: No swelling. Normal range of motion.      Cervical back: Normal range of motion and neck supple.      Right lower leg: No edema.      Left lower leg: No edema.   Skin:     General: Skin is warm and dry.   Neurological:      Mental Status: She is alert and oriented to person, place, and time.   Psychiatric:         Attention and Perception: Attention normal.         Mood and Affect: Mood normal.         Speech: Speech normal.         Behavior: Behavior normal. Behavior is cooperative.         Thought Content: Thought content normal.         Assessment    Patient's recent EGD/colonoscopy results were reviewed with her-she voiced understanding of these results.  I will be starting the patient on lansoprazole 30 mg twice daily dosing for the next 12 weeks in which she will then decrease down to once daily dosing.  She will contact the clinic in a few weeks to let us know how the medication is helping-if improving she will have primary care follow-up if not we will see her back in clinic..    She was seen in the ED in which CT scan showed 17 cm of inflammation in the small bowel and terminal ileum-they told her that they believed it to be related to ulcerative colitis.  I will go ahead and do a UC/Crohn's work-up to evaluate.   Diagnosis Plan   1. Generalized abdominal pain  C-reactive Protein    Sedimentation Rate    Calprotectin, Fecal - Stool, Per Rectum    CBC & Differential    Comprehensive Metabolic Panel    Gastrointestinal Panel, PCR - Stool, Per Rectum    Inflammatory Bowel Disease Panel   2. Gastroesophageal reflux disease without esophagitis  lansoprazole (PREVACID) 30 MG capsule   3. Diarrhea, unspecified type  C-reactive Protein    Sedimentation Rate    Calprotectin, Fecal - Stool, Per Rectum    CBC & Differential    Comprehensive Metabolic Panel    Gastrointestinal Panel, PCR - Stool, Per Rectum    Inflammatory Bowel Disease Panel       Return if  symptoms worsen or fail to improve.                   This document has been electronically signed by Shasta Schuster PA-C  June 30, 2022 12:39 EDT    Part of this note may be an electronic transcription/translation of spoken language to printed text using the Dragon Dictation System.

## 2022-06-29 ENCOUNTER — LAB (OUTPATIENT)
Dept: FAMILY MEDICINE CLINIC | Facility: CLINIC | Age: 42
End: 2022-06-29

## 2022-06-29 ENCOUNTER — TELEPHONE (OUTPATIENT)
Dept: GASTROENTEROLOGY | Facility: CLINIC | Age: 42
End: 2022-06-29

## 2022-06-29 ENCOUNTER — LAB (OUTPATIENT)
Dept: LAB | Facility: HOSPITAL | Age: 42
End: 2022-06-29

## 2022-06-29 DIAGNOSIS — R10.84 GENERALIZED ABDOMINAL PAIN: ICD-10-CM

## 2022-06-29 DIAGNOSIS — R19.7 DIARRHEA, UNSPECIFIED TYPE: ICD-10-CM

## 2022-06-29 LAB

## 2022-06-29 PROCEDURE — 85025 COMPLETE CBC W/AUTO DIFF WBC: CPT | Performed by: PHYSICIAN ASSISTANT

## 2022-06-29 PROCEDURE — 86140 C-REACTIVE PROTEIN: CPT | Performed by: PHYSICIAN ASSISTANT

## 2022-06-29 PROCEDURE — 86671 FUNGUS NES ANTIBODY: CPT | Performed by: PHYSICIAN ASSISTANT

## 2022-06-29 PROCEDURE — 86037 ANCA TITER EACH ANTIBODY: CPT | Performed by: PHYSICIAN ASSISTANT

## 2022-06-29 PROCEDURE — 85652 RBC SED RATE AUTOMATED: CPT | Performed by: PHYSICIAN ASSISTANT

## 2022-06-29 PROCEDURE — 87507 IADNA-DNA/RNA PROBE TQ 12-25: CPT

## 2022-06-29 PROCEDURE — 80053 COMPREHEN METABOLIC PANEL: CPT | Performed by: PHYSICIAN ASSISTANT

## 2022-06-29 PROCEDURE — 83993 ASSAY FOR CALPROTECTIN FECAL: CPT

## 2022-06-29 NOTE — TELEPHONE ENCOUNTER
An ER Dr. Called from [facility] regarding this patient.  They had seen her there a couple of weeks before she had her colonoscopy with Dr. Dumont.  They diagnosed ileitis from CT scan and treated her.  Dr. Dumont did colonoscopy and  there was no reported inflammation and no biopsies done other than the colon polyps. Patient was back at [facility] ER yesterday for worsening symptoms.  Abdominal pain was significant.  The  Reported that the scan today showed 17 cm of distal terminal ileum was inflamed. They discussed with patient treating her and her following up with Dr. Dumont you in a couple of weeks.  Patient wanted to follow up in our clinic this week instead.  She will be scheduled to be seen with VIRGIL Briceno.

## 2022-06-29 NOTE — TELEPHONE ENCOUNTER
I discussed Brea with Shasta this morning and she wanted the patient to have labs and stool study before she is seen. She also would like to see her records from [facility]. I called Brea while Shasta was with me and we talked to her. She will go to [facility] in [place] to get labs and stool studies (per Shasta) and will go to [facility] and request her records. We scheduled her an appointment to see Shasta on Wed the 6th. Her labs and stool studies should be back and she should have her records for Shasta to review on that day.

## 2022-06-30 LAB
ALBUMIN SERPL-MCNC: 3.9 G/DL (ref 3.5–5.2)
ALBUMIN/GLOB SERPL: 1.4 G/DL
ALP SERPL-CCNC: 75 U/L (ref 39–117)
ALT SERPL W P-5'-P-CCNC: <5 U/L (ref 1–33)
ANION GAP SERPL CALCULATED.3IONS-SCNC: 11 MMOL/L (ref 5–15)
AST SERPL-CCNC: 8 U/L (ref 1–32)
BASOPHILS # BLD AUTO: 0.02 10*3/MM3 (ref 0–0.2)
BASOPHILS NFR BLD AUTO: 0.3 % (ref 0–1.5)
BILIRUB SERPL-MCNC: 0.2 MG/DL (ref 0–1.2)
BUN SERPL-MCNC: 7 MG/DL (ref 6–20)
BUN/CREAT SERPL: 8.8 (ref 7–25)
CALCIUM SPEC-SCNC: 9.5 MG/DL (ref 8.6–10.5)
CHLORIDE SERPL-SCNC: 102 MMOL/L (ref 98–107)
CO2 SERPL-SCNC: 25 MMOL/L (ref 22–29)
CREAT SERPL-MCNC: 0.8 MG/DL (ref 0.57–1)
CRP SERPL-MCNC: 0.68 MG/DL (ref 0–0.5)
DEPRECATED RDW RBC AUTO: 40.8 FL (ref 37–54)
EGFRCR SERPLBLD CKD-EPI 2021: 95.7 ML/MIN/1.73
EOSINOPHIL # BLD AUTO: 0.03 10*3/MM3 (ref 0–0.4)
EOSINOPHIL NFR BLD AUTO: 0.4 % (ref 0.3–6.2)
ERYTHROCYTE [DISTWIDTH] IN BLOOD BY AUTOMATED COUNT: 11.8 % (ref 12.3–15.4)
ERYTHROCYTE [SEDIMENTATION RATE] IN BLOOD: 31 MM/HR (ref 0–20)
GLOBULIN UR ELPH-MCNC: 2.7 GM/DL
GLUCOSE SERPL-MCNC: 79 MG/DL (ref 65–99)
HCT VFR BLD AUTO: 42.1 % (ref 34–46.6)
HGB BLD-MCNC: 14.1 G/DL (ref 12–15.9)
IMM GRANULOCYTES # BLD AUTO: 0.02 10*3/MM3 (ref 0–0.05)
IMM GRANULOCYTES NFR BLD AUTO: 0.3 % (ref 0–0.5)
LYMPHOCYTES # BLD AUTO: 1.48 10*3/MM3 (ref 0.7–3.1)
LYMPHOCYTES NFR BLD AUTO: 20.2 % (ref 19.6–45.3)
MCH RBC QN AUTO: 31.8 PG (ref 26.6–33)
MCHC RBC AUTO-ENTMCNC: 33.5 G/DL (ref 31.5–35.7)
MCV RBC AUTO: 94.8 FL (ref 79–97)
MONOCYTES # BLD AUTO: 0.41 10*3/MM3 (ref 0.1–0.9)
MONOCYTES NFR BLD AUTO: 5.6 % (ref 5–12)
NEUTROPHILS NFR BLD AUTO: 5.36 10*3/MM3 (ref 1.7–7)
NEUTROPHILS NFR BLD AUTO: 73.2 % (ref 42.7–76)
NRBC BLD AUTO-RTO: 0 /100 WBC (ref 0–0.2)
PLATELET # BLD AUTO: 317 10*3/MM3 (ref 140–450)
PMV BLD AUTO: 9.9 FL (ref 6–12)
POTASSIUM SERPL-SCNC: 3.7 MMOL/L (ref 3.5–5.2)
PROT SERPL-MCNC: 6.6 G/DL (ref 6–8.5)
RBC # BLD AUTO: 4.44 10*6/MM3 (ref 3.77–5.28)
SODIUM SERPL-SCNC: 138 MMOL/L (ref 136–145)
WBC NRBC COR # BLD: 7.32 10*3/MM3 (ref 3.4–10.8)

## 2022-07-01 LAB
BAKER'S YEAST IGA QN: 31.1 UNITS (ref 0–24.9)
BAKER'S YEAST IGG QN: 31.4 UNITS (ref 0–24.9)
P-ANCA ATYPICAL TITR SER IF: ABNORMAL TITER

## 2022-07-03 LAB — CALPROTECTIN STL-MCNT: 384 UG/G (ref 0–120)

## 2022-07-05 DIAGNOSIS — K50.019 CROHN'S DISEASE OF SMALL INTESTINE WITH COMPLICATION: ICD-10-CM

## 2022-07-05 DIAGNOSIS — K50.019 CROHN'S DISEASE OF SMALL INTESTINE WITH COMPLICATION: Primary | ICD-10-CM

## 2022-07-05 RX ORDER — PREDNISONE 10 MG/1
10 TABLET ORAL DAILY
Qty: 52 TABLET | Refills: 0 | Status: SHIPPED | OUTPATIENT
Start: 2022-07-05 | End: 2022-08-29

## 2022-07-05 RX ORDER — MESALAMINE 1.2 G/1
1200 TABLET, DELAYED RELEASE ORAL
Qty: 30 TABLET | Refills: 11 | Status: SHIPPED | OUTPATIENT
Start: 2022-07-05

## 2022-07-05 RX ORDER — PREDNISONE 10 MG/1
10 TABLET ORAL DAILY
Qty: 52 TABLET | Refills: 0 | Status: SHIPPED | OUTPATIENT
Start: 2022-07-05 | End: 2022-07-05 | Stop reason: SDUPTHER

## 2022-07-06 ENCOUNTER — APPOINTMENT (OUTPATIENT)
Dept: CT IMAGING | Facility: HOSPITAL | Age: 42
End: 2022-07-06

## 2022-07-07 ENCOUNTER — APPOINTMENT (OUTPATIENT)
Dept: CT IMAGING | Facility: HOSPITAL | Age: 42
End: 2022-07-07

## 2022-07-08 ENCOUNTER — HOSPITAL ENCOUNTER (OUTPATIENT)
Dept: CT IMAGING | Facility: HOSPITAL | Age: 42
Discharge: HOME | End: 2022-07-08
Admitting: PHYSICIAN ASSISTANT

## 2022-07-08 DIAGNOSIS — K50.019 CROHN'S DISEASE OF SMALL INTESTINE WITH COMPLICATION: ICD-10-CM

## 2022-07-08 PROCEDURE — 25010000002 IOPAMIDOL 61 % SOLUTION: Performed by: PHYSICIAN ASSISTANT

## 2022-07-08 PROCEDURE — 74178 CT ABD&PLV WO CNTR FLWD CNTR: CPT | Performed by: RADIOLOGY

## 2022-07-08 PROCEDURE — 74178 CT ABD&PLV WO CNTR FLWD CNTR: CPT

## 2022-07-08 RX ADMIN — IOPAMIDOL 80 ML: 612 INJECTION, SOLUTION INTRAVENOUS at 09:44

## 2022-07-11 ENCOUNTER — TELEPHONE (OUTPATIENT)
Dept: UROLOGY | Facility: CLINIC | Age: 42
End: 2022-07-11

## 2022-07-12 DIAGNOSIS — K58.1 IRRITABLE BOWEL SYNDROME WITH CONSTIPATION: Primary | ICD-10-CM

## 2022-07-12 DIAGNOSIS — K50.019 CROHN'S DISEASE OF SMALL INTESTINE WITH COMPLICATION: ICD-10-CM

## 2022-07-12 RX ORDER — MAGNESIUM CARB/ALUMINUM HYDROX 105-160MG
296 TABLET,CHEWABLE ORAL TAKE AS DIRECTED
Qty: 592 ML | Refills: 0 | Status: SHIPPED | OUTPATIENT
Start: 2022-07-12 | End: 2022-07-25

## 2022-07-12 RX ORDER — BISACODYL 5 MG/1
20 TABLET, DELAYED RELEASE ORAL ONCE
Qty: 4 TABLET | Refills: 0 | Status: SHIPPED | OUTPATIENT
Start: 2022-07-12 | End: 2022-08-09

## 2022-07-21 ENCOUNTER — TELEPHONE (OUTPATIENT)
Dept: PHARMACY | Facility: HOSPITAL | Age: 42
End: 2022-07-21

## 2022-07-21 DIAGNOSIS — K50.019 CROHN'S DISEASE OF SMALL INTESTINE WITH COMPLICATION: Primary | ICD-10-CM

## 2022-07-25 ENCOUNTER — OFFICE VISIT (OUTPATIENT)
Dept: FAMILY MEDICINE CLINIC | Facility: CLINIC | Age: 42
End: 2022-07-25

## 2022-07-25 VITALS
OXYGEN SATURATION: 98 % | HEIGHT: 67 IN | RESPIRATION RATE: 18 BRPM | BODY MASS INDEX: 24.17 KG/M2 | HEART RATE: 101 BPM | TEMPERATURE: 97.3 F | WEIGHT: 154 LBS | SYSTOLIC BLOOD PRESSURE: 111 MMHG | DIASTOLIC BLOOD PRESSURE: 72 MMHG

## 2022-07-25 DIAGNOSIS — B37.0 ORAL CANDIDIASIS: ICD-10-CM

## 2022-07-25 DIAGNOSIS — Z00.00 ROUTINE MEDICAL EXAM: Primary | ICD-10-CM

## 2022-07-25 DIAGNOSIS — Z76.89 ENCOUNTER TO ESTABLISH CARE: ICD-10-CM

## 2022-07-25 DIAGNOSIS — F41.9 ANXIETY: ICD-10-CM

## 2022-07-25 DIAGNOSIS — Z00.00 ROUTINE MEDICAL EXAM: ICD-10-CM

## 2022-07-25 DIAGNOSIS — K13.79 OTHER LESIONS OF ORAL MUCOSA: ICD-10-CM

## 2022-07-25 DIAGNOSIS — Z12.31 BREAST CANCER SCREENING BY MAMMOGRAM: ICD-10-CM

## 2022-07-25 DIAGNOSIS — K21.9 GASTROESOPHAGEAL REFLUX DISEASE WITHOUT ESOPHAGITIS: ICD-10-CM

## 2022-07-25 DIAGNOSIS — K58.2 IRRITABLE BOWEL SYNDROME WITH BOTH CONSTIPATION AND DIARRHEA: ICD-10-CM

## 2022-07-25 DIAGNOSIS — F32.A DEPRESSION, UNSPECIFIED DEPRESSION TYPE: ICD-10-CM

## 2022-07-25 PROBLEM — M79.7 FIBROMYALGIA: Status: ACTIVE | Noted: 2022-07-25

## 2022-07-25 LAB
ALBUMIN SERPL-MCNC: 4.46 G/DL (ref 3.5–5.2)
ALBUMIN/GLOB SERPL: 1.7 G/DL
ALP SERPL-CCNC: 79 U/L (ref 39–117)
ALT SERPL W P-5'-P-CCNC: 16 U/L (ref 1–33)
ANION GAP SERPL CALCULATED.3IONS-SCNC: 13.7 MMOL/L (ref 5–15)
AST SERPL-CCNC: 13 U/L (ref 1–32)
BILIRUB SERPL-MCNC: 0.2 MG/DL (ref 0–1.2)
BUN SERPL-MCNC: 9 MG/DL (ref 6–20)
BUN/CREAT SERPL: 13.6 (ref 7–25)
CALCIUM SPEC-SCNC: 10.1 MG/DL (ref 8.6–10.5)
CHLORIDE SERPL-SCNC: 101 MMOL/L (ref 98–107)
CHOLEST SERPL-MCNC: 257 MG/DL (ref 0–200)
CO2 SERPL-SCNC: 23.3 MMOL/L (ref 22–29)
CREAT SERPL-MCNC: 0.66 MG/DL (ref 0.57–1)
EGFRCR SERPLBLD CKD-EPI 2021: 113.9 ML/MIN/1.73
GLOBULIN UR ELPH-MCNC: 2.6 GM/DL
GLUCOSE SERPL-MCNC: 95 MG/DL (ref 65–99)
HAV IGM SERPL QL IA: NORMAL
HBV CORE IGM SERPL QL IA: NORMAL
HBV SURFACE AG SERPL QL IA: NORMAL
HCV AB SER DONR QL: NORMAL
HDLC SERPL-MCNC: 70 MG/DL (ref 40–60)
LDLC SERPL CALC-MCNC: 154 MG/DL (ref 0–100)
LDLC/HDLC SERPL: 2.14 {RATIO}
POTASSIUM SERPL-SCNC: 3.8 MMOL/L (ref 3.5–5.2)
PROT SERPL-MCNC: 7.1 G/DL (ref 6–8.5)
SODIUM SERPL-SCNC: 138 MMOL/L (ref 136–145)
TRIGL SERPL-MCNC: 187 MG/DL (ref 0–150)
TSH SERPL DL<=0.05 MIU/L-ACNC: 2.4 UIU/ML (ref 0.27–4.2)
VLDLC SERPL-MCNC: 33 MG/DL (ref 5–40)

## 2022-07-25 PROCEDURE — 3008F BODY MASS INDEX DOCD: CPT | Performed by: NURSE PRACTITIONER

## 2022-07-25 PROCEDURE — 84443 ASSAY THYROID STIM HORMONE: CPT | Performed by: NURSE PRACTITIONER

## 2022-07-25 PROCEDURE — 2014F MENTAL STATUS ASSESS: CPT | Performed by: NURSE PRACTITIONER

## 2022-07-25 PROCEDURE — 80053 COMPREHEN METABOLIC PANEL: CPT | Performed by: PHYSICIAN ASSISTANT

## 2022-07-25 PROCEDURE — 80061 LIPID PANEL: CPT | Performed by: NURSE PRACTITIONER

## 2022-07-25 PROCEDURE — 99386 PREV VISIT NEW AGE 40-64: CPT | Performed by: NURSE PRACTITIONER

## 2022-07-25 PROCEDURE — 86480 TB TEST CELL IMMUN MEASURE: CPT | Performed by: NURSE PRACTITIONER

## 2022-07-25 PROCEDURE — 99214 OFFICE O/P EST MOD 30 MIN: CPT | Performed by: NURSE PRACTITIONER

## 2022-07-25 PROCEDURE — 80074 ACUTE HEPATITIS PANEL: CPT | Performed by: PHYSICIAN ASSISTANT

## 2022-07-25 RX ORDER — ARIPIPRAZOLE 5 MG/1
5 TABLET ORAL DAILY
COMMUNITY
End: 2022-07-25 | Stop reason: DRUGHIGH

## 2022-07-25 RX ORDER — ARIPIPRAZOLE 10 MG/1
10 TABLET ORAL DAILY
Qty: 30 TABLET | Refills: 0 | Status: SHIPPED | OUTPATIENT
Start: 2022-07-25

## 2022-07-25 NOTE — PROGRESS NOTES
Chief Complaint  Establish Care (Annual Physical/)    Subjective        Brea Fitzpatrick presents to [facility] to establish care (annual physical).    Insomnia  This is a chronic problem. Episode onset: 3-4 months. The problem occurs daily. The problem has been waxing and waning. Associated symptoms include abdominal pain, arthralgias, a change in bowel habit and fatigue. Pertinent negatives include no anorexia, chest pain, chills, congestion, coughing, diaphoresis, fever, headaches, joint swelling, myalgias, nausea, neck pain, numbness, rash, sore throat, swollen glands, urinary symptoms, vertigo, visual change, vomiting or weakness. The symptoms are aggravated by stress (fibromyalgia). Treatments tried: trazadone. The treatment provided no relief.   Heartburn  She complains of abdominal pain and heartburn. She reports no belching, no chest pain, no choking, no coughing, no dysphagia, no early satiety, no globus sensation, no hoarse voice, no nausea, no sore throat, no stridor, no tooth decay, no water brash or no wheezing. This is a chronic problem. Episode onset: 5 years or longer. The problem occurs frequently. The problem has been waxing and waning. The heartburn duration is several minutes. The heartburn is located in the substernum. The heartburn is of moderate intensity. The heartburn does not wake her from sleep. The heartburn does not limit her activity. The heartburn doesn't change with position. The symptoms are aggravated by certain foods. Associated symptoms include fatigue. Pertinent negatives include no anemia, melena, muscle weakness, orthopnea or weight loss. Risk factors include NSAIDs. She has tried a PPI for the symptoms. The treatment provided moderate relief. Past procedures include an EGD. colonoscopy.   Anxiety  Presents for initial visit. Episode onset: 10 years or longer. The problem has been gradually worsening. Symptoms include decreased concentration,  depressed mood, dry mouth, insomnia, irritability, malaise, muscle tension, nervous/anxious behavior, palpitations, panic and restlessness. Patient reports no chest pain, compulsions, confusion, dizziness, excessive worry, feeling of choking, hyperventilation, impotence, nausea, obsessions, shortness of breath or suicidal ideas. Symptoms occur most days. The severity of symptoms is moderate. The quality of sleep is poor. Nighttime awakenings: several.     Risk factors include a major life event (Divorce 2016). Her past medical history is significant for depression. Past treatments include non-SSRI antidepressants. The treatment provided moderate relief. Compliance with prior treatments has been good.   Depression  Visit Type: initial  Episode onset: 10 years ago or longer.  Progression since onset: gradually worsening  Patient presents with the following symptoms: decreased concentration, depressed mood, dry mouth, fatigue, feelings of hopelessness, feelings of worthlessness, insomnia, irritability, malaise, muscle tension, nervousness/anxiety, palpitations, panic and restlessness.  Patient is not experiencing: anhedonia, chest pain, choking sensation, compulsions, confusion, dizziness, excessive worry, hypersomnia, hyperventilation, impotence, memory impairment, nausea, obsessions, psychomotor agitation, psychomotor retardation, shortness of breath, suicidal ideas, suicidal planning, thoughts of death, weight gain and weight loss.  Frequency of symptoms: most days   Severity: moderate   Sleep quality: poor  Nighttime awakenings: several  Risk factors: major life event (Divorce 2016)  No history of: anemia  Treatment tried: non-SSRI antidepressants  Compliance with treatment: good  Improvement on treatment: moderate      Pain  This is a chronic (Fibromyalgia) problem. Episode onset: 2021. The problem occurs daily. The problem has been waxing and waning. Associated symptoms include abdominal pain, arthralgias, a  "change in bowel habit and fatigue. Pertinent negatives include no anorexia, chest pain, chills, congestion, coughing, diaphoresis, fever, headaches, joint swelling, myalgias, nausea, neck pain, numbness, rash, sore throat, swollen glands, urinary symptoms, vertigo, visual change, vomiting or weakness. The symptoms are aggravated by stress. Treatments tried: cymbalta; amitryptaline.     Objective   Vital Signs:  /72   Pulse 101   Temp 97.3 °F (36.3 °C) (Temporal)   Resp 18   Ht 170.2 cm (67\")   Wt 69.9 kg (154 lb)   SpO2 98%   BMI 24.12 kg/m²   Estimated body mass index is 24.12 kg/m² as calculated from the following:    Height as of this encounter: 170.2 cm (67\").    Weight as of this encounter: 69.9 kg (154 lb).    BMI is within normal parameters. No other follow-up for BMI required.    Physical Exam  Vitals and nursing note reviewed.   Constitutional:       General: She is awake.      Appearance: Normal appearance.   HENT:      Head: Normocephalic.      Right Ear: Hearing, tympanic membrane, ear canal and external ear normal.      Left Ear: Hearing, tympanic membrane, ear canal and external ear normal.      Nose: Nose normal.      Mouth/Throat:      Lips: Pink.      Mouth: Mucous membranes are moist. Oral lesions present.      Dentition: Has dentures.      Pharynx: Pharyngeal swelling and posterior oropharyngeal erythema present.      Comments: Oral lesions noted to right inner cheek mucosa. White patches noted to tongue  Eyes:      General: Lids are normal.      Extraocular Movements: Extraocular movements intact.      Conjunctiva/sclera: Conjunctivae normal.      Pupils: Pupils are equal, round, and reactive to light.   Cardiovascular:      Rate and Rhythm: Normal rate and regular rhythm.      Pulses: Normal pulses.      Heart sounds: Normal heart sounds.   Pulmonary:      Effort: Pulmonary effort is normal.      Breath sounds: Normal breath sounds.   Abdominal:      General: Abdomen is protuberant. " Bowel sounds are normal.      Tenderness: There is no abdominal tenderness.   Musculoskeletal:         General: Normal range of motion.      Cervical back: Normal range of motion and neck supple.      Right lower leg: No edema.      Left lower leg: No edema.   Lymphadenopathy:      Cervical: No cervical adenopathy.   Skin:     General: Skin is warm and dry.      Capillary Refill: Capillary refill takes less than 2 seconds.   Neurological:      Mental Status: She is alert and oriented to person, place, and time.      Cranial Nerves: Cranial nerves are intact.      Sensory: Sensation is intact.      Motor: Motor function is intact.      Coordination: Coordination is intact.      Gait: Gait is intact.   Psychiatric:         Attention and Perception: Attention and perception normal.         Mood and Affect: Mood is anxious. Affect is flat.         Speech: Speech is rapid and pressured.         Behavior: Behavior normal. Behavior is cooperative.         Thought Content: Thought content normal.         Cognition and Memory: Cognition normal.         Judgment: Judgment normal.          Result Review :  The following data was reviewed by: EDU Hernandez on 07/25/2022:  CMP    CMP 6/29/22 7/25/22   Glucose 79 95   BUN 7 9   Creatinine 0.80 0.66   Sodium 138 138   Potassium 3.7 3.8   Chloride 102 101   Calcium 9.5 10.1   Albumin 3.90 4.46   Total Bilirubin 0.2 0.2   Alkaline Phosphatase 75 79   AST (SGOT) 8 13   ALT (SGPT) <5 16      Comments are available for some flowsheets but are not being displayed.           CBC w/diff    CBC w/Diff 6/29/22   WBC 7.32   RBC 4.44   Hemoglobin 14.1   Hematocrit 42.1   MCV 94.8   MCH 31.8   MCHC 33.5   RDW 11.8 (A)   Platelets 317   Neutrophil Rel % 73.2   Immature Granulocyte Rel % 0.3   Lymphocyte Rel % 20.2   Monocyte Rel % 5.6   Eosinophil Rel % 0.4   Basophil Rel % 0.3   (A) Abnormal value            Lipid Panel    Lipid Panel 7/25/22   Total Cholesterol 257 (A)   Triglycerides  187 (A)   HDL Cholesterol 70 (A)   VLDL Cholesterol 33   LDL Cholesterol  154 (A)   LDL/HDL Ratio 2.14   (A) Abnormal value            TSH    TSH 7/25/22   TSH 2.400           Assessment and Plan   Diagnoses and all orders for this visit:    1. Routine medical exam (Primary)  -     TSH; Future  -     Lipid panel; Future  -     Ambulatory Referral to Obstetrics / Gynecology  -     QuantiFERON-TB Gold Plus; Future    2. Irritable bowel syndrome with both constipation and diarrhea    3. Gastroesophageal reflux disease without esophagitis    4. Breast cancer screening by mammogram  -     Mammo Screening Bilateral With CAD; Future    5. Anxiety  -     ARIPiprazole (Abilify) 10 MG tablet; Take 1 tablet by mouth Daily.  Dispense: 30 tablet; Refill: 0    6. Depression, unspecified depression type  -     ARIPiprazole (Abilify) 10 MG tablet; Take 1 tablet by mouth Daily.  Dispense: 30 tablet; Refill: 0    7. Encounter to establish care    8. Oral candidiasis  -     nystatin (MYCOSTATIN) 100,000 unit/mL suspension; Swish and swallow 5 mL 4 (Four) Times a Day As Needed (oral yeast infection).  Dispense: 473 mL; Refill: 1  -     Ambulatory Referral to ENT (Otolaryngology)    9. Other lesions of oral mucosa  -     Ambulatory Referral to ENT (Otolaryngology)         The preventive exam has been reviewed in detail.  The patient has been fully counseled on preventative guidelines for vaccines, cancer screenings, and other health maintenance needs.   The patient has been counseled on guidelines for maintaining a lifestyle to promote good health and to minimize chronic diseases.  The patient has been assisted with scheduling these healthcare procedures for the coming year and given a written document of health maintenance and anticipatory guidance for age with the AVS.    I spent 30 minutes caring for Brea on this date of service. This time includes time spent by me in the following activities:preparing for the visit, reviewing  tests, obtaining and/or reviewing a separately obtained history, performing a medically appropriate examination and/or evaluation , counseling and educating the patient/family/caregiver, ordering medications, tests, or procedures, referring and communicating with other health care professionals , documenting information in the medical record, independently interpreting results and communicating that information with the patient/family/caregiver and care coordination     Follow Up   Return in about 3 months (around 10/25/2022) for Recheck efficacy of Abilify dose adjustment.  Patient was given instructions and counseling regarding her condition or for health maintenance advice. Please see specific information pulled into the AVS if appropriate.       This document has been electronically signed by EDU Hernandez  July 25, 2022 20:04 EDT

## 2022-07-26 ENCOUNTER — PATIENT ROUNDING (BHMG ONLY) (OUTPATIENT)
Dept: FAMILY MEDICINE CLINIC | Facility: CLINIC | Age: 42
End: 2022-07-26

## 2022-07-26 NOTE — PROGRESS NOTES
July 26, 2022    Hello, may I speak with Brea Fitzpatrick?    My name is Alonzo Donovan    I am  with [facility]  [address] [address] [phone].    Before we get started may I verify your date of birth? 1980    I am calling to officially welcome you to our practice and ask about your recent visit. Is this a good time to talk?     NO - I was not able to reach the patient at the contact number listed, no voicemail set up. I will round with this patient via My Chart

## 2022-07-28 LAB
GAMMA INTERFERON BACKGROUND BLD IA-ACNC: 0 IU/ML
M TB IFN-G BLD-IMP: NEGATIVE
M TB IFN-G CD4+ BCKGRND COR BLD-ACNC: 0 IU/ML
M TB IFN-G CD4+CD8+ BCKGRND COR BLD-ACNC: 0 IU/ML
MITOGEN IGNF BLD-ACNC: 7.98 IU/ML
QUANTIFERON INCUBATION: NORMAL
SERVICE CMNT-IMP: NORMAL

## 2022-08-09 ENCOUNTER — SPECIALTY PHARMACY (OUTPATIENT)
Dept: PHARMACY | Facility: HOSPITAL | Age: 42
End: 2022-08-09

## 2022-08-09 ENCOUNTER — DISEASE STATE MANAGEMENT VISIT (OUTPATIENT)
Dept: PHARMACY | Facility: HOSPITAL | Age: 42
End: 2022-08-09

## 2022-08-09 RX ORDER — ADALIMUMAB 40MG/0.4ML
40 KIT SUBCUTANEOUS
Qty: 2 EACH | Refills: 4 | Status: SHIPPED | OUTPATIENT
Start: 2022-08-09 | End: 2023-01-04

## 2022-08-09 NOTE — PROGRESS NOTES
[facility]  Inflammatory Bowel Disease Management       Brea Fitzpatrick is a 42 y.o. female referred by Gastroenterology, Shasta Schuster to the [facility] for Crohn Disease.  The patient's current IBD regimen includes: linzess PRN and Lialda.       Past Medical History:   Diagnosis Date   • Anemia    • Colon polyp    • Depression    • Fibromyalgia    • Fibromyalgia, primary    • GERD (gastroesophageal reflux disease)    • IBS (irritable bowel syndrome)      Social History     Socioeconomic History   • Marital status:    Tobacco Use   • Smoking status: Current Every Day Smoker     Packs/day: 0.50     Years: 22.00     Pack years: 11.00     Types: Cigarettes, Cigarettes   • Smokeless tobacco: Never Used   Vaping Use   • Vaping Use: Never used   Substance and Sexual Activity   • Alcohol use: Never   • Drug use: Never     Patient has no known allergies.    Current Outpatient Medications:   •  amitriptyline (ELAVIL) 100 MG tablet, Take 100 mg by mouth Every Night., Disp: , Rfl:   •  ARIPiprazole (Abilify) 10 MG tablet, Take 1 tablet by mouth Daily., Disp: 30 tablet, Rfl: 0  •  calcium polycarbophil (FiberCon) 625 MG tablet, Take 2 tablets by mouth Daily., Disp: 60 tablet, Rfl: 10  •  DULoxetine (CYMBALTA) 60 MG capsule, Take 60 mg by mouth Daily., Disp: , Rfl:   •  lansoprazole (PREVACID) 30 MG capsule, Take 1 capsule by mouth 2 (Two) Times a Day., Disp: 180 capsule, Rfl: 3  •  mesalamine (LIALDA) 1.2 g EC tablet, Take 1 tablet by mouth Daily With Breakfast., Disp: 30 tablet, Rfl: 11  •  nystatin (MYCOSTATIN) 100,000 unit/mL suspension, Swish and swallow 5 mL 4 (Four) Times a Day As Needed (oral yeast infection)., Disp: 473 mL, Rfl: 1  •  Adalimumab (HUMIRA) 80 MG/0.8ML injection, Inject 0.8 mL under the skin into the appropriate area as directed 1 (One) Time for 1 dose. Inject 160 mg on day 1, then 80 mg/0.8 mL 2 weeks later., Disp: 3 each, Rfl: 0  •  linaclotide (LINZESS)  290 MCG capsule capsule, Take 1 capsule by mouth Every Morning Before Breakfast. (Patient taking differently: Take 290 mcg by mouth Daily As Needed (IBS).), Disp: 30 capsule, Rfl: 11  •  predniSONE (DELTASONE) 10 MG tablet, Take 1 tablet by mouth Daily. 40mgx5 days 30mgx5 20mgx5 10mgx5 5mgx4, Disp: 52 tablet, Rfl: 0    There were no vitals filed for this visit.      Labs  No results for input(s): CMP, BMP, CBC in the last 72 hours.    Pretreatment Screening  TB- Negative date 7/25/22  HBV - Non-reactive 7/25/22  HCV- Non-reactive 7/25/22      Immunizations Screening   (Live Vaccines Should not be given while on therapy)  COVID 19: Not interested   Influenza: Not interested   Pneumococcal: Not interested   Zoster: Not interested - Encouraged patient to speak with Shasta regarding shingles vaccine     Drug-Drug Interactions:     No interactions expected with Humira according to literature      Initial Education Provided for Specialty Medication  The patient has been provided with the following education and any applicable administration techniques (i.e. self-injection) have been demonstrated for the therapies indicated. All questions and concerns have been addressed prior to the patient receiving the medication, and the patient has verbalized understanding of the education and any materials provided.  Additional patient education shall be provided and documented upon request by the patient, provider or payer.            Humira (adalimumab)           Medication Expectations   Why am I taking this medication? You are taking this medication for Crohn's disease,ulcerative colitis, rheumatoid or psoriatic arthritis.   What should I expect while on this medication? You should expect a decrease in the frequency and severity of symptoms.   How does the medication work? Adalimumab binds to TNF-alpha therefore interfering with binding to a TNFa receptor site.   How long will I be on this medication for? The amount of time you  will be on this medication will be determined by your doctor and your response to the medication.    How do I take this medication? Take as directed on your prescription label.  This medication is a subcutaneous injection given in the fatty part of the skin on the top of the thigh or stomach area. May leave at room temperature for 15-30 minutes prior to injection.   What are some possible side effects? Injection site reactions and hypersensitivity reactions, headache, signs of a common cold, stomach pain, upset stomach, or back pain.   What happens if I miss a dose? If you miss a dose, take it as soon as you remember. If it is close to the time for your next dose, skip the missed dose and go back to your normal time.                    Medication Safety   What are things I should warn my doctor immediately about? Allergic reaction such has hives or trouble breathing. If you develop symptoms such as a cough that does not go away, weight loss, changes in how often you urinate, numbness or tingling in extremities, rash on cheeks or other body parts, unusual bleeding or bruising.     What are things that I should be cautious of? Injection site reaction, back pain, and headache. You may have more chance of getting an infection.  Wash your hands often and stay away from people with infections, colds, or flu.   What are some medications that can interact with this one? Immunosuppressants and vaccines.            Medication Storage/Handling   How should I handle this medication? Keep this medication our of reach of pets/children in original container.  Store in the original container to protect from light. Do not inject where the skin is tender, bruised, red, hard, or affected by psoriasis.  Rotate injection sites.   How does this medication need to be stored? Store in refrigerator and keep dry. If needed, you may store at room temperature for up to 14 days.   How should I dispose of this medication? You can dispose of the  empty syringe in a sharps container, and if this is not available you may use an empty hard plastic container such as a milk jug or tide container.            Resources/Support   How can I remind myself to take this medication? You can download a reminder brittni on your phone or use a calandar  to help with your injection.   Is financial support available?  Yes, [employer] can provide co-pay cards if you have commercial insurance or patient assistance if you have Medicare or no insurance.    Which vaccines are recommended for me? Talk to your doctor about these vaccines: Flu, Coronavirus (COVID-19), Pneumococcal (pneumonia), Tdap, Hepatitis B, Zoster (shingles).              Adherence and Self-Administration  • Barriers to Patient Adherence and/or Self-Administration: None  • Methods for Supporting Patient Adherence and/or Self-Administration: None Required     Goals of Therapy  • Patient Goals of Therapy: To not miss any doses    • Clinical Goals or Therapeutic Targets, If Applicable: Improved Quality of Life     Medication Assessment & Plan:   1. Patient provider has ordered patient to begin Humira induction phase at Humira 160 mg on day 1 then 80 mg 2 weeks later. Provided 2 injections of 80 mg subq in each side of abdomen today. 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   2. After induction phase of Humira, patient will then start Humira 40 mg subQ every other week.   3. Will follow-up in 6 months or sooner if warranted. Patient will continue routine follow-up with GI provider as scheduled.       Patient was asked to remain at clinic for at least 15 minutes following injection. The patient reported feeling well when leaving clinic.     The patient will utilize specialty mail out services for next refill.     Melissa Crawley. Lorie, PharmMARCIO  8/9/2022  09:46 EDT

## 2022-08-09 NOTE — PROGRESS NOTES
[facility]  Inflammatory Bowel Disease Management       Brea Fitzpatrick is a 42 y.o. female referred by Gastroenterology, Shasta Schuster to the [facility] for Crohn Disease.  The patient's current IBD regimen includes: linzess PRN and Lialda.       Past Medical History:   Diagnosis Date   • Anemia    • Colon polyp    • Depression    • Fibromyalgia    • Fibromyalgia, primary    • GERD (gastroesophageal reflux disease)    • IBS (irritable bowel syndrome)      Social History     Socioeconomic History   • Marital status:    Tobacco Use   • Smoking status: Current Every Day Smoker     Packs/day: 0.50     Years: 22.00     Pack years: 11.00     Types: Cigarettes, Cigarettes   • Smokeless tobacco: Never Used   Vaping Use   • Vaping Use: Never used   Substance and Sexual Activity   • Alcohol use: Never   • Drug use: Never     Patient has no known allergies.    Current Outpatient Medications:   •  Adalimumab (Humira Pen) 40 MG/0.4ML Pen-injector Kit, Inject 40 mg under the skin into the appropriate area as directed Every 14 (Fourteen) Days., Disp: 2 each, Rfl: 4  •  Adalimumab (HUMIRA) 80 MG/0.8ML injection, Inject 0.8 mL under the skin into the appropriate area as directed 1 (One) Time for 1 dose. Inject 160 mg on day 1, then 80 mg/0.8 mL 2 weeks later., Disp: 3 each, Rfl: 0  •  amitriptyline (ELAVIL) 100 MG tablet, Take 100 mg by mouth Every Night., Disp: , Rfl:   •  ARIPiprazole (Abilify) 10 MG tablet, Take 1 tablet by mouth Daily., Disp: 30 tablet, Rfl: 0  •  calcium polycarbophil (FiberCon) 625 MG tablet, Take 2 tablets by mouth Daily., Disp: 60 tablet, Rfl: 10  •  DULoxetine (CYMBALTA) 60 MG capsule, Take 60 mg by mouth Daily., Disp: , Rfl:   •  lansoprazole (PREVACID) 30 MG capsule, Take 1 capsule by mouth 2 (Two) Times a Day., Disp: 180 capsule, Rfl: 3  •  linaclotide (LINZESS) 290 MCG capsule capsule, Take 1 capsule by mouth Every Morning Before Breakfast. (Patient  taking differently: Take 290 mcg by mouth Daily As Needed (IBS).), Disp: 30 capsule, Rfl: 11  •  mesalamine (LIALDA) 1.2 g EC tablet, Take 1 tablet by mouth Daily With Breakfast., Disp: 30 tablet, Rfl: 11  •  nystatin (MYCOSTATIN) 100,000 unit/mL suspension, Swish and swallow 5 mL 4 (Four) Times a Day As Needed (oral yeast infection)., Disp: 473 mL, Rfl: 1  •  predniSONE (DELTASONE) 10 MG tablet, Take 1 tablet by mouth Daily. 40mgx5 days 30mgx5 20mgx5 10mgx5 5mgx4, Disp: 52 tablet, Rfl: 0    There were no vitals filed for this visit.      Labs  No results for input(s): CMP, BMP, CBC in the last 72 hours.    Pretreatment Screening  TB- Negative date 7/25/22  HBV - Non-reactive 7/25/22  HCV- Non-reactive 7/25/22      Immunizations Screening   (Live Vaccines Should not be given while on therapy)  COVID 19: Not interested   Influenza: Not interested   Pneumococcal: Not interested   Zoster: Not interested - Encouraged patient to speak with Shasta regarding shingles vaccine     Drug-Drug Interactions:     No interactions expected with Humira according to literature      Initial Education Provided for Specialty Medication  The patient has been provided with the following education and any applicable administration techniques (i.e. self-injection) have been demonstrated for the therapies indicated. All questions and concerns have been addressed prior to the patient receiving the medication, and the patient has verbalized understanding of the education and any materials provided.  Additional patient education shall be provided and documented upon request by the patient, provider or payer.            Humira (adalimumab)           Medication Expectations   Why am I taking this medication? You are taking this medication for Crohn's disease,ulcerative colitis, rheumatoid or psoriatic arthritis.   What should I expect while on this medication? You should expect a decrease in the frequency and severity of symptoms.   How does the  medication work? Adalimumab binds to TNF-alpha therefore interfering with binding to a TNFa receptor site.   How long will I be on this medication for? The amount of time you will be on this medication will be determined by your doctor and your response to the medication.    How do I take this medication? Take as directed on your prescription label.  This medication is a subcutaneous injection given in the fatty part of the skin on the top of the thigh or stomach area. May leave at room temperature for 15-30 minutes prior to injection.   What are some possible side effects? Injection site reactions and hypersensitivity reactions, headache, signs of a common cold, stomach pain, upset stomach, or back pain.   What happens if I miss a dose? If you miss a dose, take it as soon as you remember. If it is close to the time for your next dose, skip the missed dose and go back to your normal time.                    Medication Safety   What are things I should warn my doctor immediately about? Allergic reaction such has hives or trouble breathing. If you develop symptoms such as a cough that does not go away, weight loss, changes in how often you urinate, numbness or tingling in extremities, rash on cheeks or other body parts, unusual bleeding or bruising.     What are things that I should be cautious of? Injection site reaction, back pain, and headache. You may have more chance of getting an infection.  Wash your hands often and stay away from people with infections, colds, or flu.   What are some medications that can interact with this one? Immunosuppressants and vaccines.            Medication Storage/Handling   How should I handle this medication? Keep this medication our of reach of pets/children in original container.  Store in the original container to protect from light. Do not inject where the skin is tender, bruised, red, hard, or affected by psoriasis.  Rotate injection sites.   How does this medication need to be  stored? Store in refrigerator and keep dry. If needed, you may store at room temperature for up to 14 days.   How should I dispose of this medication? You can dispose of the empty syringe in a sharps container, and if this is not available you may use an empty hard plastic container such as a milk jug or tide container.            Resources/Support   How can I remind myself to take this medication? You can download a reminder brittni on your phone or use a calandar  to help with your injection.   Is financial support available?  Yes, [employer] can provide co-pay cards if you have commercial insurance or patient assistance if you have Medicare or no insurance.    Which vaccines are recommended for me? Talk to your doctor about these vaccines: Flu, Coronavirus (COVID-19), Pneumococcal (pneumonia), Tdap, Hepatitis B, Zoster (shingles).              Adherence and Self-Administration  • Barriers to Patient Adherence and/or Self-Administration: None  • Methods for Supporting Patient Adherence and/or Self-Administration: None Required     Goals of Therapy  • Patient Goals of Therapy: To not miss any doses    • Clinical Goals or Therapeutic Targets, If Applicable: Improved Quality of Life     Medication Assessment & Plan:   1. Patient provider has ordered patient to begin Humira induction phase at Humira 160 mg on day 1 then 80 mg 2 weeks later. Provided 2 injections of 80 mg subq in each side of abdomen today. 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   2. After induction phase of Humira, patient will then start Humira 40 mg subQ every other week.   3. Will follow-up in 6 months or sooner if warranted. Patient will continue routine follow-up with GI provider as scheduled.       Patient was asked to remain at clinic for at least 15 minutes following injection. The patient reported feeling well when leaving clinic.     The patient will utilize  specialty mail out services for next refill.     Melissa Crawley. Lorie, PharmD  8/9/2022  13:27 EDT

## 2022-08-29 ENCOUNTER — OFFICE VISIT (OUTPATIENT)
Dept: FAMILY MEDICINE CLINIC | Facility: CLINIC | Age: 42
End: 2022-08-29

## 2022-08-29 VITALS
HEART RATE: 103 BPM | HEIGHT: 67 IN | WEIGHT: 156 LBS | TEMPERATURE: 97.5 F | BODY MASS INDEX: 24.48 KG/M2 | DIASTOLIC BLOOD PRESSURE: 80 MMHG | OXYGEN SATURATION: 97 % | RESPIRATION RATE: 18 BRPM | SYSTOLIC BLOOD PRESSURE: 104 MMHG

## 2022-08-29 DIAGNOSIS — M54.50 ACUTE BILATERAL LOW BACK PAIN WITHOUT SCIATICA: ICD-10-CM

## 2022-08-29 DIAGNOSIS — M54.6 PAIN IN THORACIC SPINE: Primary | ICD-10-CM

## 2022-08-29 PROCEDURE — 99213 OFFICE O/P EST LOW 20 MIN: CPT | Performed by: NURSE PRACTITIONER

## 2022-08-29 PROCEDURE — 96372 THER/PROPH/DIAG INJ SC/IM: CPT | Performed by: NURSE PRACTITIONER

## 2022-08-29 RX ORDER — METHYLPREDNISOLONE 4 MG/1
TABLET ORAL
Qty: 21 EACH | Refills: 0 | Status: SHIPPED | OUTPATIENT
Start: 2022-08-29 | End: 2022-11-07

## 2022-08-29 RX ORDER — KETOROLAC TROMETHAMINE 30 MG/ML
60 INJECTION, SOLUTION INTRAMUSCULAR; INTRAVENOUS ONCE
Status: COMPLETED | OUTPATIENT
Start: 2022-08-29 | End: 2022-08-29

## 2022-08-29 RX ADMIN — KETOROLAC TROMETHAMINE 60 MG: 30 INJECTION, SOLUTION INTRAMUSCULAR; INTRAVENOUS at 09:17

## 2022-08-29 NOTE — PROGRESS NOTES
"Chief Complaint  Back Pain (All lower back and up spine to mid back./X 1 month./Last two weeks have been worse./)    Seema Fitzpatrick presents to [facility] for acute care (back pain).    Back Pain  This is a new problem. Episode onset: approximately one month ago; worse over the past two weeks. The problem occurs constantly. The problem has been gradually worsening since onset. The pain is present in the lumbar spine. The quality of the pain is described as aching. Radiates to: pt states low back pain radiates to thoracic spine. The pain is at a severity of 6/10. The symptoms are aggravated by lying down, position, standing and sitting. Stiffness is present in the morning. Pertinent negatives include no abdominal pain, bladder incontinence, bowel incontinence, chest pain, dysuria, fever, headaches, leg pain, numbness, paresis, paresthesias, pelvic pain, perianal numbness, tingling, weakness or weight loss. Risk factors: no acute injury. Treatments tried: methocarbimol; tylenol arthritis; flexaril.     Objective   Vital Signs:  /80   Pulse 103   Temp 97.5 °F (36.4 °C) (Temporal)   Resp 18   Ht 170.2 cm (67\")   Wt 70.8 kg (156 lb)   SpO2 97%   BMI 24.43 kg/m²   Estimated body mass index is 24.43 kg/m² as calculated from the following:    Height as of this encounter: 170.2 cm (67\").    Weight as of this encounter: 70.8 kg (156 lb).    BMI is within normal parameters. No other follow-up for BMI required.      Physical Exam  Vitals and nursing note reviewed.   Constitutional:       General: She is awake.      Appearance: Normal appearance.   HENT:      Head: Normocephalic.      Right Ear: Hearing and external ear normal.      Left Ear: Hearing and external ear normal.      Nose: Nose normal.      Mouth/Throat:      Lips: Pink.      Mouth: Mucous membranes are moist.   Eyes:      General: Lids are normal.      Conjunctiva/sclera: Conjunctivae normal.      Pupils: " Pupils are equal, round, and reactive to light.   Cardiovascular:      Rate and Rhythm: Normal rate and regular rhythm.      Pulses: Normal pulses.      Heart sounds: Normal heart sounds.   Pulmonary:      Effort: Pulmonary effort is normal.      Breath sounds: Normal breath sounds.   Abdominal:      General: Abdomen is protuberant. Bowel sounds are normal.      Palpations: Abdomen is soft.      Tenderness: There is no abdominal tenderness.   Musculoskeletal:         General: Normal range of motion.      Cervical back: Normal range of motion.      Thoracic back: Spasms and tenderness present. No swelling. Normal range of motion.      Lumbar back: Spasms and tenderness present. No swelling. Normal range of motion.      Right lower leg: No edema.      Left lower leg: No edema.   Skin:     General: Skin is warm and dry.      Capillary Refill: Capillary refill takes less than 2 seconds.   Neurological:      Mental Status: She is alert and oriented to person, place, and time.      Sensory: Sensation is intact.      Motor: Motor function is intact.      Coordination: Coordination is intact.      Gait: Gait is intact.   Psychiatric:         Attention and Perception: Attention and perception normal.         Mood and Affect: Mood and affect normal.         Speech: Speech normal.         Behavior: Behavior normal. Behavior is cooperative.         Thought Content: Thought content normal.         Cognition and Memory: Cognition normal.         Judgment: Judgment normal.          Result Review :  The following data was reviewed by: EDU Hernandez on 08/29/2022:    Data reviewed: Radiologic studies 8/29/2022 - x-ray lumbar spine; x-ray thoracic spine          Assessment and Plan   Diagnoses and all orders for this visit:    1. Pain in thoracic spine (Primary)  -     XR Spine Thoracic 2 View (In Office)  -     Ambulatory Referral to Physical Therapy  -     diclofenac (VOLTAREN) 50 MG EC tablet; Take 1 tablet by mouth 2 (Two)  Times a Day As Needed (back pain).  Dispense: 60 tablet; Refill: 0  -     ketorolac (TORADOL) injection 60 mg  -     methylPREDNISolone (MEDROL) 4 MG dose pack; Take as directed on package instructions.  Dispense: 21 each; Refill: 0    2. Acute bilateral low back pain without sciatica  -     XR Spine Lumbar 2 or 3 View (In Office)  -     Ambulatory Referral to Physical Therapy  -     diclofenac (VOLTAREN) 50 MG EC tablet; Take 1 tablet by mouth 2 (Two) Times a Day As Needed (back pain).  Dispense: 60 tablet; Refill: 0  -     ketorolac (TORADOL) injection 60 mg  -     methylPREDNISolone (MEDROL) 4 MG dose pack; Take as directed on package instructions.  Dispense: 21 each; Refill: 0         I spent 25 minutes caring for Brea on this date of service. This time includes time spent by me in the following activities:preparing for the visit, reviewing tests, obtaining and/or reviewing a separately obtained history, performing a medically appropriate examination and/or evaluation , counseling and educating the patient/family/caregiver, ordering medications, tests, or procedures, referring and communicating with other health care professionals , documenting information in the medical record, independently interpreting results and communicating that information with the patient/family/caregiver and care coordination     Follow Up   Return if symptoms worsen or fail to improve.  Patient was given instructions and counseling regarding her condition or for health maintenance advice. Please see specific information pulled into the AVS if appropriate.       This document has been electronically signed by EDU Hernandez  August 29, 2022 11:08 EDT

## 2022-09-02 ENCOUNTER — SPECIALTY PHARMACY (OUTPATIENT)
Dept: PHARMACY | Facility: HOSPITAL | Age: 42
End: 2022-09-02

## 2022-09-02 NOTE — PROGRESS NOTES
Specialty Pharmacy Refill Coordination Note      Name:  Brea Fitzpatrick  :  1980  Date:  2022         Past Medical History:   Diagnosis Date   • Anemia    • Colon polyp    • Depression    • Fibromyalgia    • Fibromyalgia, primary    • GERD (gastroesophageal reflux disease)    • IBS (irritable bowel syndrome)        Past Surgical History:   Procedure Laterality Date   • APPENDECTOMY     • COLONOSCOPY     • COLONOSCOPY N/A 2022    Procedure: COLONOSCOPY FOR SCREENING;  Surgeon: Jo Lamb MD;  Location: [facility];  Service: Gastroenterology;  Laterality: N/A;   • ENDOSCOPY N/A 2022    Procedure: ESOPHAGOGASTRODUODENOSCOPY WITH BIOPSY;  Surgeon: Jo Lamb MD;  Location: [facility];  Service: Gastroenterology;  Laterality: N/A;   • ROTATOR CUFF REPAIR Right    • TUBAL ABDOMINAL LIGATION     • UPPER GASTROINTESTINAL ENDOSCOPY         Social History     Socioeconomic History   • Marital status:    Tobacco Use   • Smoking status: Current Every Day Smoker     Packs/day: 0.50     Years: 22.00     Pack years: 11.00     Types: Cigarettes, Cigarettes   • Smokeless tobacco: Never Used   Vaping Use   • Vaping Use: Never used   Substance and Sexual Activity   • Alcohol use: Never   • Drug use: Never       Family History   Problem Relation Age of Onset   • Crohn's disease Father    • Depression Mother        No Known Allergies    Current Outpatient Medications   Medication Sig Dispense Refill   • Adalimumab (Humira Pen) 40 MG/0.4ML Pen-injector Kit Inject 40 mg under the skin into the appropriate area as directed Every 14 (Fourteen) Days. 2 each 4   • amitriptyline (ELAVIL) 100 MG tablet Take 100 mg by mouth Every Night.     • ARIPiprazole (Abilify) 10 MG tablet Take 1 tablet by mouth Daily. 30 tablet 0   • calcium polycarbophil (FiberCon) 625 MG tablet Take 2 tablets by mouth Daily. 60 tablet 10   • diclofenac (VOLTAREN) 50 MG EC tablet Take 1 tablet by mouth 2 (Two)  Times a Day As Needed (back pain). 60 tablet 0   • DULoxetine (CYMBALTA) 60 MG capsule Take 60 mg by mouth Daily.     • lansoprazole (PREVACID) 30 MG capsule Take 1 capsule by mouth 2 (Two) Times a Day. 180 capsule 3   • linaclotide (LINZESS) 290 MCG capsule capsule Take 1 capsule by mouth Every Morning Before Breakfast. (Patient taking differently: Take 290 mcg by mouth Daily As Needed (IBS).) 30 capsule 11   • mesalamine (LIALDA) 1.2 g EC tablet Take 1 tablet by mouth Daily With Breakfast. 30 tablet 11   • methylPREDNISolone (MEDROL) 4 MG dose pack Take as directed on package instructions. 21 each 0   • nystatin (MYCOSTATIN) 100,000 unit/mL suspension Swish and swallow 5 mL 4 (Four) Times a Day As Needed (oral yeast infection). 473 mL 1     No current facility-administered medications for this visit.         ASSESSMENT/PLAN:      Brea is a 42 y.o. female contacted today regarding refills of  Humira 40mg/0.4ml specialty medication(s).    Reviewed and verified with patient:       Specialty medication(s) and dose(s) confirmed: yes    Refill Questions    Flowsheet Row Most Recent Value   Changes to allergies? No   Changes to medications? No   New conditions since last clinic visit No   Unplanned office visit, urgent care, ED, or hospital admission in the last 4 weeks  No   How does patient/caregiver feel medication is working? Very good   Financial problems or insurance changes  No   Since the previous refill, were any specialty medication doses or scheduled injections missed or delayed?  No   Does this patient require a clinical escalation to a pharmacist? No                     Follow-up: 28 day(s)     Lindsey Rodrigues, Pharmacy Technician  Specialty Pharmacy Technician

## 2022-09-29 ENCOUNTER — SPECIALTY PHARMACY (OUTPATIENT)
Dept: PHARMACY | Facility: HOSPITAL | Age: 42
End: 2022-09-29

## 2022-09-30 ENCOUNTER — OFFICE VISIT (OUTPATIENT)
Dept: GASTROENTEROLOGY | Facility: CLINIC | Age: 42
End: 2022-09-30

## 2022-09-30 VITALS — HEIGHT: 67 IN | WEIGHT: 173.6 LBS | BODY MASS INDEX: 27.25 KG/M2

## 2022-09-30 DIAGNOSIS — G43.D0 ABDOMINAL MIGRAINE, NOT INTRACTABLE: ICD-10-CM

## 2022-09-30 DIAGNOSIS — M79.89 SWELLING OF LOWER EXTREMITY: ICD-10-CM

## 2022-09-30 DIAGNOSIS — K50.019 CROHN'S DISEASE OF SMALL INTESTINE WITH COMPLICATION: Primary | ICD-10-CM

## 2022-09-30 PROCEDURE — 80053 COMPREHEN METABOLIC PANEL: CPT | Performed by: PHYSICIAN ASSISTANT

## 2022-09-30 PROCEDURE — 85025 COMPLETE CBC W/AUTO DIFF WBC: CPT | Performed by: PHYSICIAN ASSISTANT

## 2022-09-30 PROCEDURE — 86140 C-REACTIVE PROTEIN: CPT | Performed by: PHYSICIAN ASSISTANT

## 2022-09-30 PROCEDURE — 85652 RBC SED RATE AUTOMATED: CPT | Performed by: PHYSICIAN ASSISTANT

## 2022-09-30 PROCEDURE — 99213 OFFICE O/P EST LOW 20 MIN: CPT | Performed by: PHYSICIAN ASSISTANT

## 2022-09-30 PROCEDURE — 96372 THER/PROPH/DIAG INJ SC/IM: CPT | Performed by: PHYSICIAN ASSISTANT

## 2022-09-30 RX ORDER — RIMEGEPANT SULFATE 75 MG/75MG
75 TABLET, ORALLY DISINTEGRATING ORAL DAILY PRN
Qty: 30 TABLET | Refills: 11 | Status: SHIPPED | OUTPATIENT
Start: 2022-09-30

## 2022-09-30 RX ORDER — FUROSEMIDE 20 MG/1
20 TABLET ORAL 2 TIMES DAILY
Qty: 10 TABLET | Refills: 0 | Status: SHIPPED | OUTPATIENT
Start: 2022-09-30 | End: 2022-11-07

## 2022-09-30 RX ORDER — KETOROLAC TROMETHAMINE 10 MG/1
10 TABLET, FILM COATED ORAL EVERY 6 HOURS PRN
Qty: 20 TABLET | Refills: 0 | Status: SHIPPED | OUTPATIENT
Start: 2022-09-30 | End: 2022-11-07

## 2022-09-30 RX ORDER — KETOROLAC TROMETHAMINE 30 MG/ML
60 INJECTION, SOLUTION INTRAMUSCULAR; INTRAVENOUS ONCE
Status: COMPLETED | OUTPATIENT
Start: 2022-09-30 | End: 2022-09-30

## 2022-09-30 RX ADMIN — KETOROLAC TROMETHAMINE 60 MG: 30 INJECTION, SOLUTION INTRAMUSCULAR; INTRAVENOUS at 11:06

## 2022-10-01 LAB
ALBUMIN SERPL-MCNC: 3.7 G/DL (ref 3.5–5.2)
ALBUMIN/GLOB SERPL: 1.5 G/DL
ALP SERPL-CCNC: 71 U/L (ref 39–117)
ALT SERPL W P-5'-P-CCNC: 17 U/L (ref 1–33)
ANION GAP SERPL CALCULATED.3IONS-SCNC: 8 MMOL/L (ref 5–15)
AST SERPL-CCNC: 17 U/L (ref 1–32)
BASOPHILS # BLD AUTO: 0.03 10*3/MM3 (ref 0–0.2)
BASOPHILS NFR BLD AUTO: 0.8 % (ref 0–1.5)
BILIRUB SERPL-MCNC: <0.2 MG/DL (ref 0–1.2)
BUN SERPL-MCNC: 7 MG/DL (ref 6–20)
BUN/CREAT SERPL: 10 (ref 7–25)
CALCIUM SPEC-SCNC: 8.9 MG/DL (ref 8.6–10.5)
CHLORIDE SERPL-SCNC: 101 MMOL/L (ref 98–107)
CO2 SERPL-SCNC: 27 MMOL/L (ref 22–29)
CREAT SERPL-MCNC: 0.7 MG/DL (ref 0.57–1)
CRP SERPL-MCNC: 0.39 MG/DL (ref 0–0.5)
DEPRECATED RDW RBC AUTO: 48.3 FL (ref 37–54)
EGFRCR SERPLBLD CKD-EPI 2021: 112.3 ML/MIN/1.73
EOSINOPHIL # BLD AUTO: 0.07 10*3/MM3 (ref 0–0.4)
EOSINOPHIL NFR BLD AUTO: 1.8 % (ref 0.3–6.2)
ERYTHROCYTE [DISTWIDTH] IN BLOOD BY AUTOMATED COUNT: 13.3 % (ref 12.3–15.4)
ERYTHROCYTE [SEDIMENTATION RATE] IN BLOOD: 21 MM/HR (ref 0–20)
GLOBULIN UR ELPH-MCNC: 2.4 GM/DL
GLUCOSE SERPL-MCNC: 67 MG/DL (ref 65–99)
HCT VFR BLD AUTO: 38.8 % (ref 34–46.6)
HGB BLD-MCNC: 12.8 G/DL (ref 12–15.9)
LYMPHOCYTES # BLD AUTO: 1.28 10*3/MM3 (ref 0.7–3.1)
LYMPHOCYTES NFR BLD AUTO: 33.1 % (ref 19.6–45.3)
MCH RBC QN AUTO: 32 PG (ref 26.6–33)
MCHC RBC AUTO-ENTMCNC: 33 G/DL (ref 31.5–35.7)
MCV RBC AUTO: 97 FL (ref 79–97)
MONOCYTES # BLD AUTO: 0.4 10*3/MM3 (ref 0.1–0.9)
MONOCYTES NFR BLD AUTO: 10.3 % (ref 5–12)
NEUTROPHILS NFR BLD AUTO: 2.02 10*3/MM3 (ref 1.7–7)
NEUTROPHILS NFR BLD AUTO: 52.2 % (ref 42.7–76)
PLATELET # BLD AUTO: 324 10*3/MM3 (ref 140–450)
PMV BLD AUTO: 10 FL (ref 6–12)
POTASSIUM SERPL-SCNC: 4 MMOL/L (ref 3.5–5.2)
PROT SERPL-MCNC: 6.1 G/DL (ref 6–8.5)
RBC # BLD AUTO: 4 10*6/MM3 (ref 3.77–5.28)
SODIUM SERPL-SCNC: 136 MMOL/L (ref 136–145)
WBC NRBC COR # BLD: 3.87 10*3/MM3 (ref 3.4–10.8)

## 2022-10-26 ENCOUNTER — SPECIALTY PHARMACY (OUTPATIENT)
Dept: PHARMACY | Facility: HOSPITAL | Age: 42
End: 2022-10-26

## 2022-10-26 NOTE — PROGRESS NOTES
Specialty Pharmacy Refill Coordination Note      Name:  Brea Fitzpatrick  :  1980  Date:  10/26/2022         Past Medical History:   Diagnosis Date   • Anemia    • Colon polyp    • Depression    • Fibromyalgia    • Fibromyalgia, primary    • GERD (gastroesophageal reflux disease)    • IBS (irritable bowel syndrome)        Past Surgical History:   Procedure Laterality Date   • APPENDECTOMY     • COLONOSCOPY     • COLONOSCOPY N/A 2022    Procedure: COLONOSCOPY FOR SCREENING;  Surgeon: Jo Lamb MD;  Location: [facility];  Service: Gastroenterology;  Laterality: N/A;   • ENDOSCOPY N/A 2022    Procedure: ESOPHAGOGASTRODUODENOSCOPY WITH BIOPSY;  Surgeon: Jo Lamb MD;  Location: [facility];  Service: Gastroenterology;  Laterality: N/A;   • ROTATOR CUFF REPAIR Right    • TUBAL ABDOMINAL LIGATION     • UPPER GASTROINTESTINAL ENDOSCOPY         Social History     Socioeconomic History   • Marital status:    Tobacco Use   • Smoking status: Every Day     Packs/day: 0.50     Years: 22.00     Pack years: 11.00     Types: Cigarettes   • Smokeless tobacco: Never   Vaping Use   • Vaping Use: Never used   Substance and Sexual Activity   • Alcohol use: Never   • Drug use: Never       Family History   Problem Relation Age of Onset   • Crohn's disease Father    • Depression Mother        No Known Allergies    Current Outpatient Medications   Medication Sig Dispense Refill   • Adalimumab (Humira Pen) 40 MG/0.4ML Pen-injector Kit Inject 40 mg under the skin into the appropriate area as directed Every 14 (Fourteen) Days. 2 each 4   • amitriptyline (ELAVIL) 100 MG tablet Take 100 mg by mouth Every Night.     • ARIPiprazole (Abilify) 10 MG tablet Take 1 tablet by mouth Daily. 30 tablet 0   • calcium polycarbophil (FiberCon) 625 MG tablet Take 2 tablets by mouth Daily. 60 tablet 10   • diclofenac (VOLTAREN) 50 MG EC tablet Take 1 tablet by mouth 2 (Two) Times a Day As Needed (back pain).  60 tablet 0   • DULoxetine (CYMBALTA) 60 MG capsule Take 60 mg by mouth Daily.     • furosemide (Lasix) 20 MG tablet Take 1 tablet by mouth 2 (Two) Times a Day. 10 tablet 0   • ketorolac (TORADOL) 10 MG tablet Take 1 tablet by mouth Every 6 (Six) Hours As Needed for Moderate Pain. 20 tablet 0   • lansoprazole (PREVACID) 30 MG capsule Take 1 capsule by mouth 2 (Two) Times a Day. 180 capsule 3   • linaclotide (LINZESS) 290 MCG capsule capsule Take 1 capsule by mouth Every Morning Before Breakfast. (Patient taking differently: Take 290 mcg by mouth Daily As Needed (IBS).) 30 capsule 11   • mesalamine (LIALDA) 1.2 g EC tablet Take 1 tablet by mouth Daily With Breakfast. 30 tablet 11   • methylPREDNISolone (MEDROL) 4 MG dose pack Take as directed on package instructions. 21 each 0   • nystatin (MYCOSTATIN) 100,000 unit/mL suspension Swish and swallow 5 mL 4 (Four) Times a Day As Needed (oral yeast infection). 473 mL 1   • Rimegepant Sulfate (Nurtec) 75 MG tablet dispersible tablet Take 1 tablet by mouth Daily As Needed (migraine). 30 tablet 11     No current facility-administered medications for this visit.         ASSESSMENT/PLAN:      Brea is a 42 y.o. female contacted today regarding refills of  Humira 40mg/0.4ml specialty medication(s).    Reviewed and verified with patient:       Specialty medication(s) and dose(s) confirmed: yes    Refill Questions    Flowsheet Row Most Recent Value   Changes to allergies? No   Changes to medications? No   New conditions since last clinic visit No   Unplanned office visit, urgent care, ED, or hospital admission in the last 4 weeks  No   How does patient/caregiver feel medication is working? Very good   Financial problems or insurance changes  No   Since the previous refill, were any specialty medication doses or scheduled injections missed or delayed?  No   Does this patient require a clinical escalation to a pharmacist? No                     Follow-up: 28 day(s)     Lindsey  Ravi, Pharmacy Technician  Specialty Pharmacy Technician

## 2022-11-07 ENCOUNTER — OFFICE VISIT (OUTPATIENT)
Dept: FAMILY MEDICINE CLINIC | Facility: CLINIC | Age: 42
End: 2022-11-07

## 2022-11-07 VITALS
TEMPERATURE: 97.6 F | BODY MASS INDEX: 26.37 KG/M2 | SYSTOLIC BLOOD PRESSURE: 131 MMHG | HEIGHT: 67 IN | OXYGEN SATURATION: 98 % | HEART RATE: 68 BPM | RESPIRATION RATE: 18 BRPM | WEIGHT: 168 LBS | DIASTOLIC BLOOD PRESSURE: 74 MMHG

## 2022-11-07 DIAGNOSIS — G47.00 INSOMNIA, UNSPECIFIED TYPE: ICD-10-CM

## 2022-11-07 DIAGNOSIS — G89.29 CHRONIC BILATERAL LOW BACK PAIN WITHOUT SCIATICA: Primary | ICD-10-CM

## 2022-11-07 DIAGNOSIS — M54.50 CHRONIC BILATERAL LOW BACK PAIN WITHOUT SCIATICA: Primary | ICD-10-CM

## 2022-11-07 PROCEDURE — 99214 OFFICE O/P EST MOD 30 MIN: CPT | Performed by: NURSE PRACTITIONER

## 2022-11-07 RX ORDER — HYDROXYZINE HYDROCHLORIDE 25 MG/1
25 TABLET, FILM COATED ORAL NIGHTLY PRN
Qty: 30 TABLET | Refills: 0 | Status: SHIPPED | OUTPATIENT
Start: 2022-11-07

## 2022-11-07 NOTE — PATIENT INSTRUCTIONS
Insomnia  Insomnia is a sleep disorder that makes it difficult to fall asleep or stay asleep. Insomnia can cause fatigue, low energy, difficulty concentrating, mood swings, and poor performance at work or school.  There are three different ways to classify insomnia:  Difficulty falling asleep.  Difficulty staying asleep.  Waking up too early in the morning.  Any type of insomnia can be long-term (chronic) or short-term (acute). Both are common. Short-term insomnia usually lasts for three months or less. Chronic insomnia occurs at least three times a week for longer than three months.  What are the causes?  Insomnia may be caused by another condition, situation, or substance, such as:  Anxiety.  Certain medicines.  Gastroesophageal reflux disease (GERD) or other gastrointestinal conditions.  Asthma or other breathing conditions.  Restless legs syndrome, sleep apnea, or other sleep disorders.  Chronic pain.  Menopause.  Stroke.  Abuse of alcohol, tobacco, or illegal drugs.  Mental health conditions, such as depression.  Caffeine.  Neurological disorders, such as Alzheimer's disease.  An overactive thyroid (hyperthyroidism).  Sometimes, the cause of insomnia may not be known.  What increases the risk?  Risk factors for insomnia include:  Gender. Women are affected more often than men.  Age. Insomnia is more common as you get older.  Stress.  Lack of exercise.  Irregular work schedule or working night shifts.  Traveling between different time zones.  Certain medical and mental health conditions.  What are the signs or symptoms?  If you have insomnia, the main symptom is having trouble falling asleep or having trouble staying asleep. This may lead to other symptoms, such as:  Feeling fatigued or having low energy.  Feeling nervous about going to sleep.  Not feeling rested in the morning.  Having trouble concentrating.  Feeling irritable, anxious, or depressed.  How is this diagnosed?  This condition may be diagnosed  based on:  Your symptoms and medical history. Your health care provider may ask about:  Your sleep habits.  Any medical conditions you have.  Your mental health.  A physical exam.  How is this treated?  Treatment for insomnia depends on the cause. Treatment may focus on treating an underlying condition that is causing insomnia. Treatment may also include:  Medicines to help you sleep.  Counseling or therapy.  Lifestyle adjustments to help you sleep better.  Follow these instructions at home:  Eating and drinking    Limit or avoid alcohol, caffeinated beverages, and cigarettes, especially close to bedtime. These can disrupt your sleep.  Do not eat a large meal or eat spicy foods right before bedtime. This can lead to digestive discomfort that can make it hard for you to sleep.  Sleep habits    Keep a sleep diary to help you and your health care provider figure out what could be causing your insomnia. Write down:  When you sleep.  When you wake up during the night.  How well you sleep.  How rested you feel the next day.  Any side effects of medicines you are taking.  What you eat and drink.  Make your bedroom a dark, comfortable place where it is easy to fall asleep.  Put up shades or blackout curtains to block light from outside.  Use a white noise machine to block noise.  Keep the temperature cool.  Limit screen use before bedtime. This includes:  Watching TV.  Using your smartphone, tablet, or computer.  Stick to a routine that includes going to bed and waking up at the same times every day and night. This can help you fall asleep faster. Consider making a quiet activity, such as reading, part of your nighttime routine.  Try to avoid taking naps during the day so that you sleep better at night.  Get out of bed if you are still awake after 15 minutes of trying to sleep. Keep the lights down, but try reading or doing a quiet activity. When you feel sleepy, go back to bed.  General instructions  Take over-the-counter  and prescription medicines only as told by your health care provider.  Exercise regularly, as told by your health care provider. Avoid exercise starting several hours before bedtime.  Use relaxation techniques to manage stress. Ask your health care provider to suggest some techniques that may work well for you. These may include:  Breathing exercises.  Routines to release muscle tension.  Visualizing peaceful scenes.  Make sure that you drive carefully. Avoid driving if you feel very sleepy.  Keep all follow-up visits as told by your health care provider. This is important.  Contact a health care provider if:  You are tired throughout the day.  You have trouble in your daily routine due to sleepiness.  You continue to have sleep problems, or your sleep problems get worse.  Get help right away if:  You have serious thoughts about hurting yourself or someone else.  If you ever feel like you may hurt yourself or others, or have thoughts about taking your own life, get help right away. You can go to your nearest emergency department or call:  Your local emergency services (911 in the [place]  A suicide crisis helpline, such as the National Suicide Prevention Lifeline at 1-335.395.6947 or 778 in the [place] This is open 24 hours a day.  Summary  Insomnia is a sleep disorder that makes it difficult to fall asleep or stay asleep.  Insomnia can be long-term (chronic) or short-term (acute).  Treatment for insomnia depends on the cause. Treatment may focus on treating an underlying condition that is causing insomnia.  Keep a sleep diary to help you and your health care provider figure out what could be causing your insomnia.  This information is not intended to replace advice given to you by your health care provider. Make sure you discuss any questions you have with your health care provider.  Document Revised: 07/13/2022 Document Reviewed: 10/28/2021  Elsevier Patient Education © 2022 [employer]

## 2022-11-07 NOTE — PROGRESS NOTES
"Chief Complaint  Back Pain (States its slightly better./)    Subjective        Brea Fitzpatrick presents to [facility] for follow up (back pain and insomnia).    Back Pain  This is a chronic problem. Episode onset: approximately 4 months ago. The problem occurs intermittently. The problem has been gradually improving since onset. The pain is present in the thoracic spine. The quality of the pain is described as aching. The pain is mild (\"nagging pain, but tolerable\"). The pain is worse during the day. The symptoms are aggravated by bending and position. Associated symptoms include headaches. Pertinent negatives include no abdominal pain, bladder incontinence, bowel incontinence, chest pain, dysuria, fever, leg pain, numbness, paresis, paresthesias, pelvic pain, perianal numbness, tingling, weakness or weight loss. Treatments tried: physical therapy; rest; ibuprofen. The treatment provided moderate relief.   Insomnia  This is a new problem. Episode onset: a week or two ago. The problem occurs daily. The problem has been gradually worsening. Associated symptoms include fatigue and headaches. Pertinent negatives include no abdominal pain, anorexia, arthralgias, change in bowel habit, chest pain, chills, congestion, coughing, diaphoresis, fever, joint swelling, myalgias, nausea, neck pain, numbness, rash, sore throat, swollen glands, urinary symptoms, vertigo, visual change, vomiting or weakness. The symptoms are aggravated by stress. Treatments tried: trazadone; melatonin (can't tolerate due to side effects); healthy sleep habits. The treatment provided mild relief.     Objective   Vital Signs:  /74   Pulse 68   Temp 97.6 °F (36.4 °C) (Temporal)   Resp 18   Ht 170.2 cm (67\")   Wt 76.2 kg (168 lb)   SpO2 98%   BMI 26.31 kg/m²   Estimated body mass index is 26.31 kg/m² as calculated from the following:    Height as of this encounter: 170.2 cm (67\").    Weight as of this encounter: " 76.2 kg (168 lb).    BMI is >= 25 and <30. (Overweight) The following options were offered after discussion;: weight loss educational material (shared in after visit summary)      Physical Exam  Vitals and nursing note reviewed.   Constitutional:       General: She is awake.      Appearance: Normal appearance.   HENT:      Head: Normocephalic.      Right Ear: Hearing, tympanic membrane, ear canal and external ear normal.      Left Ear: Hearing, tympanic membrane, ear canal and external ear normal.      Nose: Nose normal.      Mouth/Throat:      Lips: Pink.      Mouth: Mucous membranes are moist.      Pharynx: Oropharynx is clear.   Eyes:      General: Lids are normal.      Conjunctiva/sclera: Conjunctivae normal.      Pupils: Pupils are equal, round, and reactive to light.   Cardiovascular:      Rate and Rhythm: Normal rate and regular rhythm.      Heart sounds: Normal heart sounds.   Pulmonary:      Effort: Pulmonary effort is normal.      Breath sounds: Normal breath sounds.   Abdominal:      General: Abdomen is protuberant. Bowel sounds are normal.      Palpations: Abdomen is soft.      Tenderness: There is no abdominal tenderness.   Musculoskeletal:         General: Normal range of motion.      Cervical back: Normal range of motion and neck supple.      Thoracic back: Spasms and tenderness present.      Lumbar back: Spasms and tenderness present.   Skin:     General: Skin is warm and dry.      Capillary Refill: Capillary refill takes less than 2 seconds.   Neurological:      Mental Status: She is alert and oriented to person, place, and time.      Sensory: Sensation is intact.      Motor: Motor function is intact.      Coordination: Coordination is intact.      Gait: Gait is intact.   Psychiatric:         Attention and Perception: Attention and perception normal.         Mood and Affect: Mood is anxious. Affect is flat.         Speech: Speech is rapid and pressured.         Behavior: Behavior normal. Behavior is  cooperative.         Thought Content: Thought content normal.         Cognition and Memory: Cognition normal.         Judgment: Judgment normal.        Result Review :  The following data was reviewed by: EDU Hernandez on 11/07/2022:    Data reviewed: Radiologic studies 8/29/2022 - x-ray thoracic spine; x-ray lumbar spine          Assessment and Plan   Diagnoses and all orders for this visit:    1. Chronic bilateral low back pain without sciatica (Primary)  Comments:  Improving; Continue Tylenol/Ibuprofen as previously tolerated    2. Insomnia, unspecified type  -     hydrOXYzine (ATARAX) 25 MG tablet; Take 1 tablet by mouth At Night As Needed (insomnia).  Dispense: 30 tablet; Refill: 0         I spent 30 minutes caring for Brea on this date of service. This time includes time spent by me in the following activities:preparing for the visit, reviewing tests, obtaining and/or reviewing a separately obtained history, performing a medically appropriate examination and/or evaluation , counseling and educating the patient/family/caregiver, ordering medications, tests, or procedures, documenting information in the medical record and independently interpreting results and communicating that information with the patient/family/caregiver     Follow Up   Return in about 8 months (around 6/30/2023) for Annual physical, Recheck fasting labs.  Patient was given instructions and counseling regarding her condition or for health maintenance advice. Please see specific information pulled into the AVS if appropriate.       This document has been electronically signed by EDU Hernandez  November 7, 2022 14:47 EST

## 2022-11-14 ENCOUNTER — TELEPHONE (OUTPATIENT)
Dept: GASTROENTEROLOGY | Facility: CLINIC | Age: 42
End: 2022-11-14

## 2022-11-16 ENCOUNTER — TELEPHONE (OUTPATIENT)
Dept: GASTROENTEROLOGY | Facility: CLINIC | Age: 42
End: 2022-11-16

## 2022-11-23 ENCOUNTER — SPECIALTY PHARMACY (OUTPATIENT)
Dept: PHARMACY | Facility: HOSPITAL | Age: 42
End: 2022-11-23

## 2022-11-23 ENCOUNTER — TRANSCRIBE ORDERS (OUTPATIENT)
Dept: ONCOLOGY | Facility: HOSPITAL | Age: 42
End: 2022-11-23

## 2022-11-23 ENCOUNTER — TELEPHONE (OUTPATIENT)
Dept: GASTROENTEROLOGY | Facility: CLINIC | Age: 42
End: 2022-11-23

## 2022-11-23 DIAGNOSIS — K58.2 IRRITABLE BOWEL SYNDROME WITH BOTH CONSTIPATION AND DIARRHEA: Primary | ICD-10-CM

## 2022-11-23 NOTE — PROGRESS NOTES
Specialty Pharmacy Refill Coordination Note      Name:  Brea Fitzpatrick  :  1980  Date:  2022         Past Medical History:   Diagnosis Date   • Anemia    • Colon polyp    • Depression    • Fibromyalgia    • Fibromyalgia, primary    • GERD (gastroesophageal reflux disease)    • IBS (irritable bowel syndrome)        Past Surgical History:   Procedure Laterality Date   • APPENDECTOMY     • COLONOSCOPY     • COLONOSCOPY N/A 2022    Procedure: COLONOSCOPY FOR SCREENING;  Surgeon: Jo Lamb MD;  Location: [facility];  Service: Gastroenterology;  Laterality: N/A;   • ENDOSCOPY N/A 2022    Procedure: ESOPHAGOGASTRODUODENOSCOPY WITH BIOPSY;  Surgeon: Jo Lamb MD;  Location: [facility];  Service: Gastroenterology;  Laterality: N/A;   • ROTATOR CUFF REPAIR Right    • TUBAL ABDOMINAL LIGATION     • UPPER GASTROINTESTINAL ENDOSCOPY         Social History     Socioeconomic History   • Marital status:    Tobacco Use   • Smoking status: Every Day     Packs/day: 0.50     Years: 22.00     Pack years: 11.00     Types: Cigarettes   • Smokeless tobacco: Never   Vaping Use   • Vaping Use: Never used   Substance and Sexual Activity   • Alcohol use: Never   • Drug use: Never   • Sexual activity: Yes     Partners: Male     Birth control/protection: Surgical     Comment: Tubal       Family History   Problem Relation Age of Onset   • Crohn's disease Father    • Depression Mother        No Known Allergies    Current Outpatient Medications   Medication Sig Dispense Refill   • Adalimumab (Humira Pen) 40 MG/0.4ML Pen-injector Kit Inject 40 mg under the skin into the appropriate area as directed Every 14 (Fourteen) Days. 2 each 4   • amitriptyline (ELAVIL) 100 MG tablet Take 100 mg by mouth Every Night.     • ARIPiprazole (Abilify) 10 MG tablet Take 1 tablet by mouth Daily. 30 tablet 0   • calcium polycarbophil (FiberCon) 625 MG tablet Take 2 tablets by mouth Daily. 60 tablet 10   •  diclofenac (VOLTAREN) 50 MG EC tablet Take 1 tablet by mouth 2 (Two) Times a Day As Needed (back pain). 60 tablet 0   • DULoxetine (CYMBALTA) 60 MG capsule Take 60 mg by mouth Daily.     • hydrOXYzine (ATARAX) 25 MG tablet Take 1 tablet by mouth At Night As Needed (insomnia). 30 tablet 0   • linaclotide (LINZESS) 290 MCG capsule capsule Take 1 capsule by mouth Every Morning Before Breakfast. (Patient taking differently: Take 1 capsule by mouth Daily As Needed (IBS).) 30 capsule 11   • mesalamine (LIALDA) 1.2 g EC tablet Take 1 tablet by mouth Daily With Breakfast. 30 tablet 11   • nystatin (MYCOSTATIN) 100,000 unit/mL suspension Swish and swallow 5 mL 4 (Four) Times a Day As Needed (oral yeast infection). 473 mL 1   • Rimegepant Sulfate (Nurtec) 75 MG tablet dispersible tablet Take 1 tablet by mouth Daily As Needed (migraine). 30 tablet 11     No current facility-administered medications for this visit.         ASSESSMENT/PLAN:      Brea is a 42 y.o. female contacted today regarding refills of  Humira 40mg/0.4ml specialty medication(s).    Reviewed and verified with patient:       Specialty medication(s) and dose(s) confirmed: yes    Refill Questions    Flowsheet Row Most Recent Value   Changes to allergies? No   Changes to medications? No   New conditions since last clinic visit Yes  [Pt was having issues with her feet & ankles swelling/burning. She spoke with GI and was supposed to discuss potentailly switching to stelara, but will not be able to get pt in before her next shot is due. pt is requesting we send out humira in meantime]   Unplanned office visit, urgent care, ED, or hospital admission in the last 4 weeks  No   How does patient/caregiver feel medication is working? Good   Financial problems or insurance changes  No   Since the previous refill, were any specialty medication doses or scheduled injections missed or delayed?  No   Does this patient require a clinical escalation to a pharmacist? Yes                      Follow-up: 28 day(s)     Lindsey Rodrigues, Pharmacy Technician  Specialty Pharmacy Technician

## 2022-11-28 NOTE — PROGRESS NOTES
Patient reported that she was having swelling/burning in her ankles/feet. Reached out to Shasta Schuster today (11/23/22) to see if she wanted patient to continue another dose of Humira until she had another plan or follow- up with her or to stop Humira. Her office was already aware of patient complaints and had mentioned trying a new biologic, however, a new referral had not been received. Shasta sent in a new referral to try a different biologic but reported in the mean time that the patient could do another dose of Humira especially since she was requesting to not be without any medication until another biologic is approved. It is unsure that the complaints patient has is indeed related to Humira as Shasta reports their office has not had this type of reaction occur previously.     Thank you,    Melissa Crawley. Lorie, PharmD  11/23/22  07:26 EST

## 2022-12-08 ENCOUNTER — INFUSION (OUTPATIENT)
Dept: ONCOLOGY | Facility: HOSPITAL | Age: 42
End: 2022-12-08

## 2022-12-08 VITALS
SYSTOLIC BLOOD PRESSURE: 116 MMHG | OXYGEN SATURATION: 98 % | HEART RATE: 83 BPM | RESPIRATION RATE: 16 BRPM | DIASTOLIC BLOOD PRESSURE: 77 MMHG | TEMPERATURE: 98.2 F

## 2022-12-08 DIAGNOSIS — K58.2 IRRITABLE BOWEL SYNDROME WITH BOTH CONSTIPATION AND DIARRHEA: Primary | ICD-10-CM

## 2022-12-08 PROCEDURE — 96365 THER/PROPH/DIAG IV INF INIT: CPT

## 2022-12-08 PROCEDURE — 25010000002 USTEKINUMAB 130 MG/26ML SOLUTION 26 ML VIAL: Performed by: PHYSICIAN ASSISTANT

## 2022-12-08 RX ADMIN — USTEKINUMAB 390 MG: 130 SOLUTION INTRAVENOUS at 11:01

## 2023-01-06 ENCOUNTER — OFFICE VISIT (OUTPATIENT)
Dept: GASTROENTEROLOGY | Facility: CLINIC | Age: 43
End: 2023-01-06
Payer: MEDICAID

## 2023-01-06 VITALS
BODY MASS INDEX: 27.21 KG/M2 | WEIGHT: 173.4 LBS | HEART RATE: 86 BPM | DIASTOLIC BLOOD PRESSURE: 95 MMHG | SYSTOLIC BLOOD PRESSURE: 141 MMHG | HEIGHT: 67 IN

## 2023-01-06 DIAGNOSIS — K21.9 GASTROESOPHAGEAL REFLUX DISEASE WITHOUT ESOPHAGITIS: ICD-10-CM

## 2023-01-06 DIAGNOSIS — M54.50 CHRONIC MIDLINE LOW BACK PAIN WITHOUT SCIATICA: ICD-10-CM

## 2023-01-06 DIAGNOSIS — K50.019 CROHN'S DISEASE OF SMALL INTESTINE WITH COMPLICATION: Primary | ICD-10-CM

## 2023-01-06 DIAGNOSIS — G89.29 CHRONIC MIDLINE LOW BACK PAIN WITHOUT SCIATICA: ICD-10-CM

## 2023-01-06 PROCEDURE — 99214 OFFICE O/P EST MOD 30 MIN: CPT | Performed by: PHYSICIAN ASSISTANT

## 2023-01-06 RX ORDER — PREDNISONE 10 MG/1
10 TABLET ORAL TAKE AS DIRECTED
Qty: 34 TABLET | Refills: 0 | Status: SHIPPED | OUTPATIENT
Start: 2023-01-06 | End: 2023-02-02

## 2023-01-06 NOTE — PROGRESS NOTES
"Chief Complaint   Patient presents with   • Crohn's Disease       Brea Fitzpatrick is a 42 y.o. female who presents to the office today for evaluation of Crohn's Disease  .    HPI  The patient is here for a follow-up for Crohn's disease and constipation. 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 She went through with her first infusion of Stelara on 12/08/2022.    The patient reports that her stomach feels like something is \"in there' some days, but not all the time.  She reports that she keeps belching, and it is not like a rotten egg taste, but it has a taste to it. The patient states that it is not as bad as it usually is. She reports that it hurts a lot mid abdomen, and her lower abdomen is really tender to the touch. She states that her constipation has been good. The patient reports that the bottom of her feet are still burning. She states that her last Humira injection was about 5 to 6 weeks ago. She reports that the burning is not as bad as it was; it was her whole feet, but now it is just on the pads of her feet.    She states that her bowel movements are pretty regular now; she has a bowel movement daily at 6:30 AM. She states that it is usually not formed, but at least she is going. She states that she feels a lot of burning in her stomach. She reports that she has acid reflux, and omeprazole helps. If she misses a dose, she can definitely tell. She states that the burning she feels now does not feel like acid reflux.    She reports that her back pain is unreal. She states that it is hurting her so bad every day, and she has had to take Flexeril and 2 Tylenol 650 mg. The patient reports can not sleep at night; it wakes her up. She states that it is in the same spot in her lower back. She reports that she is miserable about her back more than anything. The patient states that she has had x-rays done on her back, and everything " was fine with her spine. She states that she saw her PCP, EDU Moe, last summer, and she sent her to physical therapy. She reports that it seemed to help, but then it was over. Now the pain is back and she feels like it has got worse. She denies any radiating pain down the legs. She denies any nerve like burning or tingling.     The patient reports that she is having a fibromyalgia flare up right now.    Review of Systems   Constitutional: Negative for activity change, fatigue, fever and unexpected weight change.   HENT: Negative for sore throat and trouble swallowing.    Eyes: Negative.    Respiratory: Negative for chest tightness.    Cardiovascular: Negative for chest pain.   Gastrointestinal: Positive for abdominal distention, abdominal pain, diarrhea and nausea. Negative for anal bleeding, blood in stool, constipation, rectal pain and vomiting.   Endocrine: Negative.    Genitourinary: Negative for difficulty urinating.   Musculoskeletal: Negative for back pain and neck pain.   Skin: Negative.    Allergic/Immunologic: Negative for environmental allergies and food allergies.   Neurological: Negative for dizziness and headaches.   Hematological: Does not bruise/bleed easily.   Psychiatric/Behavioral: Negative for agitation and sleep disturbance. The patient is not nervous/anxious.        ACTIVE PROBLEMS:   Specialty Problems        Gastroenterology Problems    Gastroesophageal reflux disease without esophagitis        Nausea and vomiting        Irritable bowel syndrome with both constipation and diarrhea           PAST MEDICAL HISTORY:  Past Medical History:   Diagnosis Date   • Anemia    • Colon polyp    • Depression    • Fibromyalgia    • Fibromyalgia, primary    • GERD (gastroesophageal reflux disease)    • IBS (irritable bowel syndrome)        SURGICAL HISTORY:  Past Surgical History:   Procedure Laterality Date   • APPENDECTOMY     • COLONOSCOPY     • COLONOSCOPY N/A 6/7/2022    Procedure: COLONOSCOPY  FOR SCREENING;  Surgeon: Jo Lamb MD;  Location:  [facility];  Service: Gastroenterology;  Laterality: N/A;   • ENDOSCOPY N/A 6/7/2022    Procedure: ESOPHAGOGASTRODUODENOSCOPY WITH BIOPSY;  Surgeon: Jo Lamb MD;  Location:  [facility];  Service: Gastroenterology;  Laterality: N/A;   • ROTATOR CUFF REPAIR Right    • TUBAL ABDOMINAL LIGATION     • UPPER GASTROINTESTINAL ENDOSCOPY         FAMILY HISTORY:  Family History   Problem Relation Age of Onset   • Crohn's disease Father    • Depression Mother        SOCIAL HISTORY:  Social History     Tobacco Use   • Smoking status: Every Day     Packs/day: 0.50     Years: 22.00     Pack years: 11.00     Types: Cigarettes   • Smokeless tobacco: Never   Substance Use Topics   • Alcohol use: Never       CURRENT MEDICATION:    Current Outpatient Medications:   •  amitriptyline (ELAVIL) 100 MG tablet, Take 100 mg by mouth Every Night., Disp: , Rfl:   •  ARIPiprazole (Abilify) 10 MG tablet, Take 1 tablet by mouth Daily., Disp: 30 tablet, Rfl: 0  •  calcium polycarbophil (FiberCon) 625 MG tablet, Take 2 tablets by mouth Daily., Disp: 60 tablet, Rfl: 10  •  diclofenac (VOLTAREN) 50 MG EC tablet, Take 1 tablet by mouth 2 (Two) Times a Day As Needed (back pain)., Disp: 60 tablet, Rfl: 0  •  DULoxetine (CYMBALTA) 60 MG capsule, Take 60 mg by mouth Daily., Disp: , Rfl:   •  hydrOXYzine (ATARAX) 25 MG tablet, Take 1 tablet by mouth At Night As Needed (insomnia)., Disp: 30 tablet, Rfl: 0  •  linaclotide (LINZESS) 290 MCG capsule capsule, Take 1 capsule by mouth Every Morning Before Breakfast. (Patient taking differently: Take 290 mcg by mouth Daily As Needed (IBS).), Disp: 30 capsule, Rfl: 11  •  mesalamine (LIALDA) 1.2 g EC tablet, Take 1 tablet by mouth Daily With Breakfast., Disp: 30 tablet, Rfl: 11  •  nystatin (MYCOSTATIN) 100,000 unit/mL suspension, Swish and swallow 5 mL 4 (Four) Times a Day As Needed (oral yeast infection)., Disp: 473 mL, Rfl: 1  •   "Rimegepant Sulfate (Nurtec) 75 MG tablet dispersible tablet, Take 1 tablet by mouth Daily As Needed (migraine)., Disp: 30 tablet, Rfl: 11  •  predniSONE (DELTASONE) 10 MG tablet, Take 1 tablet by mouth Take As Directed. Take 4 tablets x 4 days, 3 tabs x 3, 2 tabs x 3, 1 tab x 3, Disp: 34 tablet, Rfl: 0    ALLERGIES:  Patient has no known allergies.    VISIT VITALS:  /95   Pulse 86   Ht 170.2 cm (67\")   Wt 78.7 kg (173 lb 6.4 oz)   BMI 27.16 kg/m²   Physical Exam  Constitutional:       General: She is not in acute distress.     Appearance: Normal appearance. She is well-developed.   HENT:      Head: Normocephalic and atraumatic.   Eyes:      Pupils: Pupils are equal, round, and reactive to light.   Cardiovascular:      Rate and Rhythm: Normal rate and regular rhythm.      Heart sounds: Normal heart sounds.   Pulmonary:      Effort: Pulmonary effort is normal. No respiratory distress.      Breath sounds: Normal breath sounds. No wheezing, rhonchi or rales.   Abdominal:      General: Abdomen is flat. Bowel sounds are normal. There is no distension.      Palpations: Abdomen is soft. There is no mass.      Tenderness: There is no abdominal tenderness. There is no guarding or rebound.      Hernia: No hernia is present.   Musculoskeletal:         General: No swelling. Normal range of motion.      Cervical back: Normal range of motion and neck supple.      Right lower leg: No edema.      Left lower leg: No edema.   Skin:     General: Skin is warm and dry.   Neurological:      Mental Status: She is alert and oriented to person, place, and time.   Psychiatric:         Attention and Perception: Attention normal.         Mood and Affect: Mood normal.         Speech: Speech normal.         Behavior: Behavior normal. Behavior is cooperative.         Thought Content: Thought content normal.         Assessment       Diagnosis Plan   1. Crohn's disease of small intestine with complication (HCC)  predniSONE (DELTASONE) 10 " MG tablet    Calprotectin, Fecal - Stool, Per Rectum      2. Gastroesophageal reflux disease without esophagitis        3. Chronic midline low back pain without sciatica          1.Chrohn's disease  - She will continue Stelara injections every 8 weeks.  - She will start a prednisone taper, 40 mg, 30 mg, 20 mg, and 10 mg.   - A fecal calprotectin was ordered.    2. Gastroesophageal reflux disease, unspecified whether esophagitis present   - She will continue omeprazole.    3. Chronic bilateral low back pain without sciatica   - She will follow up with her primary care physician.    Return in about 8 months (around 9/6/2023).       Return in 09/2023.              Part of this note may be an electronic transcription/translation of spoken language to printed text using the Dragon Dictation System.    Transcribed from ambient dictation for Shasta Schuster PA-C by Krystin Hinds.  01/06/23   10:06 EST    Patient or patient representative verbalized consent to the visit recording.  I have personally performed the services described in this document as transcribed by the above individual, and it is both accurate and complete.

## 2023-01-09 ENCOUNTER — LAB (OUTPATIENT)
Dept: FAMILY MEDICINE CLINIC | Facility: CLINIC | Age: 43
End: 2023-01-09
Payer: MEDICAID

## 2023-01-09 DIAGNOSIS — K50.019 CROHN'S DISEASE OF SMALL INTESTINE WITH COMPLICATION: ICD-10-CM

## 2023-01-09 PROCEDURE — 83993 ASSAY FOR CALPROTECTIN FECAL: CPT | Performed by: PHYSICIAN ASSISTANT

## 2023-01-10 ENCOUNTER — TELEPHONE (OUTPATIENT)
Dept: FAMILY MEDICINE CLINIC | Facility: CLINIC | Age: 43
End: 2023-01-10
Payer: MEDICAID

## 2023-01-12 LAB — CALPROTECTIN STL-MCNT: 259 UG/G (ref 0–120)

## 2023-01-13 ENCOUNTER — TELEPHONE (OUTPATIENT)
Dept: GASTROENTEROLOGY | Facility: CLINIC | Age: 43
End: 2023-01-13
Payer: MEDICAID

## 2023-01-13 NOTE — TELEPHONE ENCOUNTER
Please let patient know that her fecal calprotectin level has came down to 259 which is still considered elevated however she was previously at 384.

## 2023-01-13 NOTE — TELEPHONE ENCOUNTER
Spoke to patient  I notified her that that her fecal calprotectin level has came down to 259 which is still considered elevated however she was previously at 384. Patient verbalized understanding.

## 2023-01-16 ENCOUNTER — OFFICE VISIT (OUTPATIENT)
Dept: FAMILY MEDICINE CLINIC | Facility: CLINIC | Age: 43
End: 2023-01-16
Payer: MEDICAID

## 2023-01-16 VITALS
DIASTOLIC BLOOD PRESSURE: 75 MMHG | RESPIRATION RATE: 18 BRPM | HEIGHT: 67 IN | SYSTOLIC BLOOD PRESSURE: 121 MMHG | OXYGEN SATURATION: 98 % | WEIGHT: 173 LBS | BODY MASS INDEX: 27.15 KG/M2 | HEART RATE: 91 BPM | TEMPERATURE: 97.5 F

## 2023-01-16 DIAGNOSIS — R05.9 COUGH, UNSPECIFIED TYPE: Primary | ICD-10-CM

## 2023-01-16 DIAGNOSIS — U07.1 COVID-19 VIRUS INFECTION: ICD-10-CM

## 2023-01-16 LAB
EXPIRATION DATE: ABNORMAL
FLUAV AG UPPER RESP QL IA.RAPID: NOT DETECTED
FLUBV AG UPPER RESP QL IA.RAPID: NOT DETECTED
INTERNAL CONTROL: ABNORMAL
Lab: ABNORMAL
SARS-COV-2 AG UPPER RESP QL IA.RAPID: DETECTED

## 2023-01-16 PROCEDURE — 99213 OFFICE O/P EST LOW 20 MIN: CPT | Performed by: FAMILY MEDICINE

## 2023-01-16 RX ORDER — AZITHROMYCIN 250 MG/1
TABLET, FILM COATED ORAL
Qty: 6 TABLET | Refills: 0 | Status: SHIPPED | OUTPATIENT
Start: 2023-01-16 | End: 2023-02-02

## 2023-01-16 RX ORDER — BROMPHENIRAMINE MALEATE, PSEUDOEPHEDRINE HYDROCHLORIDE, AND DEXTROMETHORPHAN HYDROBROMIDE 2; 30; 10 MG/5ML; MG/5ML; MG/5ML
10 SYRUP ORAL 3 TIMES DAILY PRN
Qty: 300 ML | Refills: 0 | Status: SHIPPED | OUTPATIENT
Start: 2023-01-16 | End: 2023-02-02

## 2023-01-16 NOTE — LETTER
January 16, 2023     Patient: Brea Fitzpatrick   YOB: 1980   Date of Visit: 1/16/2023       To Whom It May Concern:    It is my medical opinion that Brea Fitzpatrick may return to work on 1/23/22. Patient with tested positive for Covid.           Sincerely,        Miguel Ángel Fregoso MD    CC: No Recipients

## 2023-01-16 NOTE — Clinical Note
January 16, 2023     Patient: Brea Fitzpatrick   YOB: 1980   Date of Visit: 1/16/2023       To Whom It May Concern:    It is my medical opinion that Brea Fitzpatrick may return to work in two days.         Sincerely,        Miguel Ángel Fregoso MD    CC: No Recipients

## 2023-01-16 NOTE — PROGRESS NOTES
"Chief Complaint  Cough (Nasal Congestion & Chest Tightness started Saturday. Positive COVID test at home today./)    Seema Fitzpatrick presents to [facility]  History of Present Illness  The patient is a 42-year-old female who is here today because of nasal congestion cough with some chest tightness for 2 days.  She denies any fever, chills, headaches or body aches.  The patient stated that she tested positive for COVID at home today.  The patient also denies knowing anyone or being exposed to anyone with COVID.      Objective   Vital Signs:  /75 (BP Location: Right arm, Patient Position: Sitting, Cuff Size: Adult)   Pulse 91   Temp 97.5 °F (36.4 °C) (Temporal)   Resp 18   Ht 170.2 cm (67\")   Wt 78.5 kg (173 lb)   SpO2 98%   BMI 27.10 kg/m²   Estimated body mass index is 27.1 kg/m² as calculated from the following:    Height as of this encounter: 170.2 cm (67\").    Weight as of this encounter: 78.5 kg (173 lb).       BMI is >= 25 and <30. (Overweight) The following options were offered after discussion;: exercise counseling/recommendations and nutrition counseling/recommendations      Physical Exam  Vitals and nursing note reviewed.   Constitutional:       Appearance: Normal appearance.   HENT:      Head: Normocephalic and atraumatic.      Right Ear: Tympanic membrane and ear canal normal.      Left Ear: Tympanic membrane and ear canal normal.      Nose: Congestion and rhinorrhea present.      Right Sinus: No maxillary sinus tenderness or frontal sinus tenderness.      Left Sinus: No maxillary sinus tenderness or frontal sinus tenderness.      Mouth/Throat:      Mouth: Mucous membranes are moist.   Eyes:      Extraocular Movements: Extraocular movements intact.      Pupils: Pupils are equal, round, and reactive to light.   Cardiovascular:      Rate and Rhythm: Normal rate and regular rhythm.  No extrasystoles are present.     Heart sounds: Normal heart " sounds. No murmur heard.  Pulmonary:      Effort: Pulmonary effort is normal.      Breath sounds: Normal breath sounds. No decreased breath sounds, wheezing or rhonchi.   Abdominal:      Palpations: Abdomen is soft. There is no mass.      Tenderness: There is no abdominal tenderness. There is no right CVA tenderness, left CVA tenderness, guarding or rebound.   Musculoskeletal:         General: Normal range of motion.      Cervical back: Normal range of motion and neck supple.      Right lower leg: No edema.      Left lower leg: No edema.   Skin:     Findings: No rash.   Neurological:      General: No focal deficit present.      Mental Status: She is alert and oriented to person, place, and time.      Sensory: Sensation is intact.      Motor: Motor function is intact.      Coordination: Coordination is intact.      Gait: Gait is intact.      Deep Tendon Reflexes: Reflexes are normal and symmetric.   Psychiatric:         Attention and Perception: Attention and perception normal.         Mood and Affect: Mood normal.         Speech: Speech normal.         Behavior: Behavior normal. Behavior is cooperative.         Thought Content: Thought content normal.        Result Review :                   Assessment and Plan   Diagnoses and all orders for this visit:    1. Cough, unspecified type (Primary)  -     POCT SARS-CoV-2 Antigen NAVI + Flu    2. COVID-19 virus infection  -     azithromycin (Zithromax) 250 MG tablet; Take 2 tablets the first day, then 1 tablet daily for 4 days.  Dispense: 6 tablet; Refill: 0  -     brompheniramine-pseudoephedrine-DM 30-2-10 MG/5ML syrup; Take 10 mL by mouth 3 (Three) Times a Day As Needed for Congestion or Cough for up to 10 days.  Dispense: 300 mL; Refill: 0           I spent 20 minutes caring for Brea on this date of service. This time includes time spent by me in the following activities:preparing for the visit, reviewing tests, performing a medically appropriate examination and/or  evaluation , counseling and educating the patient/family/caregiver, ordering medications, tests, or procedures and documenting information in the medical record       Follow Up   Return if symptoms worsen or fail to improve.  Patient was given instructions and counseling regarding her condition or for health maintenance advice. Please see specific information pulled into the AVS if appropriate.     The patient was advised rest, increase oral fluids, may take Tylenol or ibuprofen as needed for fever and body aches.  The patient is taking prednisone right now and advised to continue and finish her prednisone prescription.  We will add Bromfed-DM to be taken 10 mL p.o. 3 times daily and Z-Anthony as directed.  The patient is advised to go to the emergency room or return to the clinic if symptoms worsen or fail to improve.      This document has been electronically signed by Miguel Ángel Fregoso MD  January 16, 2023 16:29 EST

## 2023-01-26 ENCOUNTER — TELEPHONE (OUTPATIENT)
Dept: UROLOGY | Facility: CLINIC | Age: 43
End: 2023-01-26
Payer: MEDICAID

## 2023-02-02 ENCOUNTER — SPECIALTY PHARMACY (OUTPATIENT)
Dept: PHARMACY | Facility: HOSPITAL | Age: 43
End: 2023-02-02
Payer: MEDICAID

## 2023-02-02 ENCOUNTER — DISEASE STATE MANAGEMENT VISIT (OUTPATIENT)
Dept: PHARMACY | Facility: HOSPITAL | Age: 43
End: 2023-02-02
Payer: MEDICAID

## 2023-02-02 RX ORDER — OMEPRAZOLE 20 MG/1
20 CAPSULE, DELAYED RELEASE ORAL DAILY
COMMUNITY
Start: 2023-01-13

## 2023-02-02 RX ORDER — USTEKINUMAB 90 MG/ML
90 INJECTION, SOLUTION SUBCUTANEOUS
Qty: 1 ML | Refills: 5 | Status: SHIPPED | OUTPATIENT
Start: 2023-02-02

## 2023-02-02 NOTE — PROGRESS NOTES
[facility]  Inflammatory Bowel Disease Management       Brea Fitzpatrick is a 42 y.o. female referred by Gastroenterology to the [facility] for Crohn Disease.  The patient's current IBD regimen includes: Lialda, Linzess PRN, and Fibercon  Patient previously tried Humira from 2022-2022 in which was stopped secondary to intolerances including headache and neuropathy in feet.   The patient denies any known allergy to components of Stelara or a latex allergy. Patient received Stelara infusion on  23. Pt reports tolerating the Stelara infusion well without any issues.          Past Medical History:   Diagnosis Date   • Anemia    • Colon polyp    • Depression    • Fibromyalgia    • Fibromyalgia, primary    • GERD (gastroesophageal reflux disease)    • IBS (irritable bowel syndrome)      Social History     Socioeconomic History   • Marital status:    Tobacco Use   • Smoking status: Former     Packs/day: 0.00     Years: 22.00     Pack years: 0.00     Types: Cigarettes     Quit date: 2023     Years since quittin.0   • Smokeless tobacco: Never   Vaping Use   • Vaping Use: Never used   Substance and Sexual Activity   • Alcohol use: Never   • Drug use: Never   • Sexual activity: Yes     Partners: Male     Birth control/protection: Surgical     Comment: Tubal     Patient has no known allergies.    Current Outpatient Medications:   •  amitriptyline (ELAVIL) 100 MG tablet, Take 100 mg by mouth Every Night., Disp: , Rfl:   •  ARIPiprazole (Abilify) 10 MG tablet, Take 1 tablet by mouth Daily., Disp: 30 tablet, Rfl: 0  •  calcium polycarbophil (FiberCon) 625 MG tablet, Take 2 tablets by mouth Daily. (Patient taking differently: Take 625 mg by mouth Daily.), Disp: 60 tablet, Rfl: 10  •  diclofenac (VOLTAREN) 50 MG EC tablet, Take 1 tablet by mouth 2 (Two) Times a Day As Needed (back pain)., Disp: 60 tablet, Rfl: 0  •  DULoxetine (CYMBALTA) 60 MG capsule, Take 60 mg  by mouth Daily., Disp: , Rfl:   •  hydrOXYzine (ATARAX) 25 MG tablet, Take 1 tablet by mouth At Night As Needed (insomnia)., Disp: 30 tablet, Rfl: 0  •  linaclotide (LINZESS) 290 MCG capsule capsule, Take 1 capsule by mouth Every Morning Before Breakfast. (Patient taking differently: Take 290 mcg by mouth Daily As Needed (IBS).), Disp: 30 capsule, Rfl: 11  •  mesalamine (LIALDA) 1.2 g EC tablet, Take 1 tablet by mouth Daily With Breakfast., Disp: 30 tablet, Rfl: 11  •  nystatin (MYCOSTATIN) 100,000 unit/mL suspension, Swish and swallow 5 mL 4 (Four) Times a Day As Needed (oral yeast infection)., Disp: 473 mL, Rfl: 1  •  omeprazole (priLOSEC) 20 MG capsule, Take 20 mg by mouth Daily., Disp: , Rfl:   •  Rimegepant Sulfate (Nurtec) 75 MG tablet dispersible tablet, Take 1 tablet by mouth Daily As Needed (migraine)., Disp: 30 tablet, Rfl: 11  •  Ustekinumab (Stelara) 90 MG/ML solution prefilled syringe Injection, Inject 90 mg under the skin into the appropriate area as directed Every 2 (Two) Months., Disp: 1 mL, Rfl: 5    There were no vitals filed for this visit.      Labs  No results for input(s): CMP, BMP, CBC in the last 72 hours.    Pretreatment Screening  TB- Negative date 7/25/22  HBV - Non-reactive 7/25/22  HCV- Non-reactive 7/25/22      Immunizations Screening   (Live Vaccines Should not be given while on therapy)  COVID 19: Not interested   Influenza: Not intersted  Pneumococcal: Not interested   Zoster: Not interested. Again encouraged patient to speak with [email or web address].     Drug-Drug Interactions: No drug-drug interactions expected with Stelara according to literature.       Initial Education Provided for Specialty Medication  The patient has been provided with education and any applicable administration techniques (i.e. self-injection) have been demonstrated for the therapies indicated. All questions and concerns have been addressed prior to the patient receiving the medication, and the patient has verbalized  understanding of the education and any materials provided.  Additional patient education shall be provided and documented upon request by the patient, provider or payer.        Patient Specific Counseling Points: Pregnancy and Breastfeeding            - Literature recommends for females of child bearing potential to have management of maternal disease optimized prior to pregnancy.          - Information related to the use of Stelara in pregnancy is limited. It is not known if Stelara can harm your unborn baby. Provider and patient should make clinical decision if Stelara is started.          - If Stelara is decided to be continued between physician and patient agreement, the patient should keep close follow-up especially towards the term of pregnancy for direction on when last injection should be given prior to estimated date of delivery.            -Stelara may be present in breast milk.  The decision to breastfeed during therapy should consider the risk of infant exposure versus the benefits of breast feeding to the infant.               Adherence and Self-Administration  • Barriers to Patient Adherence and/or Self-Administration: None  • Methods for Supporting Patient Adherence and/or Self-Administration: None Required    Goals of Therapy  • Patient Goals of Therapy: Not to miss any doses   • Clinical Goals or Therapeutic Targets, If Applicable: Symptom reduction and mucosal healing     Medication Assessment & Plan:   1. Patient received Stelara infusion on 12/8/22 and presented to clinic today to begin maintenance dose of Stelara 90 mg every 8 weeks. 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 Injection administered into back of the RIGHT arm. Patient was asked to remain on campus 15 minutes following injection in which she reported feeling well when she  left.   2. After today's initial maintenance injection, patient is anticipated to remain on Stelara 90 mg every 8 weeks if continued tolerance and symptom improvement.   3. Will follow-up in 6 months or sooner if warranted. Patient will continue routine follow-up with GI provider as scheduled. Patient last had TB/HBV/HCV in July 2022. Follow for yearly monitoring.   4. Patient would like to receive specialty mail out services. Patient requests Rx be sent to work:        Address: [address] [place] [id number]    Melissa Crawley. Lorie, PharmD  2/2/2023  11:46 EST

## 2023-02-02 NOTE — PROGRESS NOTES
[facility]  Inflammatory Bowel Disease Management       Brea Fitzpatrick is a 42 y.o. female referred by Gastroenterology to the [facility] for Crohn Disease.  The patient's current IBD regimen includes: Lialda, Linzess PRN, and Fibercon  Patient previously tried Humira from 2022-2022 in which was stopped secondary to intolerances including headache and neuropathy in feet.   The patient denies any known allergy to components of Stelara or a latex allergy. Patient received Stelara infusion on  23. Pt reports tolerating the Stelara infusion well without any issues.          Past Medical History:   Diagnosis Date   • Anemia    • Colon polyp    • Depression    • Fibromyalgia    • Fibromyalgia, primary    • GERD (gastroesophageal reflux disease)    • IBS (irritable bowel syndrome)      Social History     Socioeconomic History   • Marital status:    Tobacco Use   • Smoking status: Former     Packs/day: 0.00     Years: 22.00     Pack years: 0.00     Types: Cigarettes     Quit date: 2023     Years since quittin.0   • Smokeless tobacco: Never   Vaping Use   • Vaping Use: Never used   Substance and Sexual Activity   • Alcohol use: Never   • Drug use: Never   • Sexual activity: Yes     Partners: Male     Birth control/protection: Surgical     Comment: Tubal     Patient has no known allergies.    Current Outpatient Medications:   •  amitriptyline (ELAVIL) 100 MG tablet, Take 100 mg by mouth Every Night., Disp: , Rfl:   •  ARIPiprazole (Abilify) 10 MG tablet, Take 1 tablet by mouth Daily., Disp: 30 tablet, Rfl: 0  •  azithromycin (Zithromax) 250 MG tablet, Take 2 tablets the first day, then 1 tablet daily for 4 days., Disp: 6 tablet, Rfl: 0  •  calcium polycarbophil (FiberCon) 625 MG tablet, Take 2 tablets by mouth Daily., Disp: 60 tablet, Rfl: 10  •  diclofenac (VOLTAREN) 50 MG EC tablet, Take 1 tablet by mouth 2 (Two) Times a Day As Needed (back pain).,  Disp: 60 tablet, Rfl: 0  •  DULoxetine (CYMBALTA) 60 MG capsule, Take 60 mg by mouth Daily., Disp: , Rfl:   •  hydrOXYzine (ATARAX) 25 MG tablet, Take 1 tablet by mouth At Night As Needed (insomnia)., Disp: 30 tablet, Rfl: 0  •  linaclotide (LINZESS) 290 MCG capsule capsule, Take 1 capsule by mouth Every Morning Before Breakfast. (Patient taking differently: Take 290 mcg by mouth Daily As Needed (IBS).), Disp: 30 capsule, Rfl: 11  •  mesalamine (LIALDA) 1.2 g EC tablet, Take 1 tablet by mouth Daily With Breakfast., Disp: 30 tablet, Rfl: 11  •  nystatin (MYCOSTATIN) 100,000 unit/mL suspension, Swish and swallow 5 mL 4 (Four) Times a Day As Needed (oral yeast infection)., Disp: 473 mL, Rfl: 1  •  predniSONE (DELTASONE) 10 MG tablet, Take 1 tablet by mouth Take As Directed. Take 4 tablets x 4 days, 3 tabs x 3, 2 tabs x 3, 1 tab x 3, Disp: 34 tablet, Rfl: 0  •  Rimegepant Sulfate (Nurtec) 75 MG tablet dispersible tablet, Take 1 tablet by mouth Daily As Needed (migraine)., Disp: 30 tablet, Rfl: 11  •  Ustekinumab (Stelara) 90 MG/ML solution prefilled syringe Injection, Inject 90 mg under the skin into the appropriate area as directed Every 2 (Two) Months., Disp: 1 mL, Rfl: 5    There were no vitals filed for this visit.      Labs  No results for input(s): CMP, BMP, CBC in the last 72 hours.    Pretreatment Screening  TB- Negative date 7/25/22  HBV - Non-reactive 7/25/22  HCV- Non-reactive 7/25/22      Immunizations Screening   (Live Vaccines Should not be given while on therapy)  COVID 19: Not interested   Influenza: Not intersted  Pneumococcal: Not interested   Zoster: Not interested. Again encouraged patient to speak with [facility].     Drug-Drug Interactions: No drug-drug interactions expected with Stelara according to literature.       Initial Education Provided for Specialty Medication  The patient has been provided with education and any applicable administration techniques (i.e. self-injection) have been  demonstrated for the therapies indicated. All questions and concerns have been addressed prior to the patient receiving the medication, and the patient has verbalized understanding of the education and any materials provided.  Additional patient education shall be provided and documented upon request by the patient, provider or payer.        Patient Specific Counseling Points: Pregnancy and Breastfeeding            - Literature recommends for females of child bearing potential to have management of maternal disease optimized prior to pregnancy.          - Information related to the use of Stelara in pregnancy is limited. It is not known if Stelara can harm your unborn baby. Provider and patient should make clinical decision if Stelara is started.          - If Stelara is decided to be continued between physician and patient agreement, the patient should keep close follow-up especially towards the term of pregnancy for direction on when last injection should be given prior to estimated date of delivery.            -Stelara may be present in breast milk.  The decision to breastfeed during therapy should consider the risk of infant exposure versus the benefits of breast feeding to the infant.               Adherence and Self-Administration  • Barriers to Patient Adherence and/or Self-Administration: None  • Methods for Supporting Patient Adherence and/or Self-Administration: None Required    Goals of Therapy  • Patient Goals of Therapy: Not to miss any doses   • Clinical Goals or Therapeutic Targets, If Applicable: Symptom reduction and mucosal healing     Medication Assessment & Plan:   1. Patient received Stelara infusion on 12/8/22 and presented to clinic today to begin maintenance dose of Stelara 90 mg every 8 weeks. 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 Injection administered into back of the RIGHT arm. Patient was asked to remain on campus 15 minutes following injection in which she reported feeling well when she left.   2. After today's initial maintenance injection, patient is anticipated to remain on Stelara 90 mg every 8 weeks if continued tolerance and symptom improvement.   3. Will follow-up in 6 months or sooner if warranted. Patient will continue routine follow-up with GI provider as scheduled. Patient last had TB/HBV/HCV in July 2022. Follow for yearly monitoring.   4. Patient would like to receive specialty mail out services. Patient requests Rx be sent to work:        Address: [address] [place] [id number]    Melissa Crawley. Lorie, PharmD  2/2/2023  10:45 EST

## 2023-02-20 ENCOUNTER — OFFICE VISIT (OUTPATIENT)
Dept: FAMILY MEDICINE CLINIC | Facility: CLINIC | Age: 43
End: 2023-02-20
Payer: MEDICAID

## 2023-02-20 VITALS
DIASTOLIC BLOOD PRESSURE: 82 MMHG | OXYGEN SATURATION: 98 % | RESPIRATION RATE: 20 BRPM | SYSTOLIC BLOOD PRESSURE: 114 MMHG | HEIGHT: 67 IN | WEIGHT: 185 LBS | HEART RATE: 89 BPM | TEMPERATURE: 97.7 F | BODY MASS INDEX: 29.03 KG/M2

## 2023-02-20 DIAGNOSIS — R20.8 BURNING SENSATION OF FEET: ICD-10-CM

## 2023-02-20 DIAGNOSIS — M25.571 ACUTE BILATERAL ANKLE PAIN: Primary | ICD-10-CM

## 2023-02-20 DIAGNOSIS — M25.572 ACUTE BILATERAL ANKLE PAIN: Primary | ICD-10-CM

## 2023-02-20 PROCEDURE — 99213 OFFICE O/P EST LOW 20 MIN: CPT | Performed by: FAMILY MEDICINE

## 2023-02-20 RX ORDER — PREDNISONE 10 MG/1
10 TABLET ORAL 2 TIMES DAILY
Qty: 14 TABLET | Refills: 0 | Status: SHIPPED | OUTPATIENT
Start: 2023-02-20 | End: 2023-02-27

## 2023-02-20 NOTE — PROGRESS NOTES
"Chief Complaint  feet burning sensation (Off and on x 1 month) and Ankle Pain (X 3 weeks; states no injury but feels like sprain)    Seema Fitzpatrick presents to [facility]  History of Present Illness  The patient is a 44 yo female who is here because of a burning sensation on both feet for 1 month, usually worse after working. She also has pain on both ankles that he been going on for 3 week, she denies any recent injuries or falls.      Objective   Vital Signs:  /82 (BP Location: Right arm, Patient Position: Sitting, Cuff Size: Adult)   Pulse 89   Temp 97.7 °F (36.5 °C) (Temporal)   Resp 20   Ht 170.2 cm (67\")   Wt 83.9 kg (185 lb)   SpO2 98%   BMI 28.98 kg/m²   Estimated body mass index is 28.98 kg/m² as calculated from the following:    Height as of this encounter: 170.2 cm (67\").    Weight as of this encounter: 83.9 kg (185 lb).             Physical Exam  Vitals and nursing note reviewed.   Constitutional:       Appearance: Normal appearance.   HENT:      Head: Normocephalic and atraumatic.      Right Ear: Tympanic membrane and ear canal normal.      Left Ear: Tympanic membrane and ear canal normal.      Nose: Nose normal.      Mouth/Throat:      Mouth: Mucous membranes are moist.   Eyes:      Extraocular Movements: Extraocular movements intact.      Pupils: Pupils are equal, round, and reactive to light.   Cardiovascular:      Rate and Rhythm: Normal rate and regular rhythm.  No extrasystoles are present.     Heart sounds: Normal heart sounds. No murmur heard.  Pulmonary:      Effort: Pulmonary effort is normal.      Breath sounds: Normal breath sounds. No decreased breath sounds, wheezing or rhonchi.   Abdominal:      Palpations: Abdomen is soft. There is no mass.      Tenderness: There is no abdominal tenderness. There is no right CVA tenderness, left CVA tenderness, guarding or rebound.   Musculoskeletal:         General: Normal range of motion.     "  Cervical back: Normal range of motion and neck supple.      Right lower leg: No edema.      Left lower leg: No edema.      Right ankle: Tenderness ( anterior area) present.      Left ankle: Tenderness (anterio-lateral area) present.   Skin:     Findings: No rash.   Neurological:      General: No focal deficit present.      Mental Status: She is alert and oriented to person, place, and time.      Sensory: Sensation is intact.      Motor: Motor function is intact.      Coordination: Coordination is intact.      Gait: Gait is intact.      Deep Tendon Reflexes: Reflexes are normal and symmetric.   Psychiatric:         Attention and Perception: Attention and perception normal.         Mood and Affect: Mood normal.         Speech: Speech normal.         Behavior: Behavior normal. Behavior is cooperative.         Thought Content: Thought content normal.        Result Review :                   Assessment and Plan   Diagnoses and all orders for this visit:    1. Acute bilateral ankle pain (Primary)  Comments:  ? tendinitis    Orders:  -     predniSONE (DELTASONE) 10 MG tablet; Take 1 tablet by mouth 2 (Two) Times a Day for 7 days.  Dispense: 14 tablet; Refill: 0    2. Burning sensation of feet  -     predniSONE (DELTASONE) 10 MG tablet; Take 1 tablet by mouth 2 (Two) Times a Day for 7 days.  Dispense: 14 tablet; Refill: 0           I spent 20 minutes caring for Brea on this date of service. This time includes time spent by me in the following activities:preparing for the visit, performing a medically appropriate examination and/or evaluation , ordering medications, tests, or procedures and documenting information in the medical record       Follow Up   Return if symptoms worsen or fail to improve.  Patient was given instructions and counseling regarding her condition or for health maintenance advice. Please see specific information pulled into the AVS if appropriate.     The patient is advised to continue all of her  regular medications as prescribed. She was counseled regarding the importance of diet, exercise and medication compliance. Advised to return to the clinic if symptom worsen or fail to improve.      This document has been electronically signed by Miguel Ángel Fregoso MD  February 20, 2023 15:51 EST

## 2023-03-13 ENCOUNTER — OFFICE VISIT (OUTPATIENT)
Dept: FAMILY MEDICINE CLINIC | Facility: CLINIC | Age: 43
End: 2023-03-13
Payer: MEDICAID

## 2023-03-13 VITALS
HEART RATE: 110 BPM | HEIGHT: 67 IN | TEMPERATURE: 97.3 F | WEIGHT: 195.8 LBS | SYSTOLIC BLOOD PRESSURE: 120 MMHG | DIASTOLIC BLOOD PRESSURE: 82 MMHG | RESPIRATION RATE: 18 BRPM | OXYGEN SATURATION: 98 % | BODY MASS INDEX: 30.73 KG/M2

## 2023-03-13 DIAGNOSIS — M79.672 BILATERAL FOOT PAIN: Primary | ICD-10-CM

## 2023-03-13 DIAGNOSIS — M79.671 BILATERAL FOOT PAIN: Primary | ICD-10-CM

## 2023-03-13 PROCEDURE — 99213 OFFICE O/P EST LOW 20 MIN: CPT | Performed by: NURSE PRACTITIONER

## 2023-03-13 NOTE — PROGRESS NOTES
"Chief Complaint  Follow-up (Continuing care of bilateral foot pain)    Subjective        Brea Fitzpatrick presents to [facility] for follow up (alec foot pain and weight gain).    Obesity  This is a new problem. Episode onset: worse over the past couple of months. The problem has been gradually worsening. Associated symptoms include joint swelling. Pertinent negatives include no abdominal pain, anorexia, arthralgias, change in bowel habit, chest pain, chills, congestion, coughing, diaphoresis, fatigue, fever, headaches, myalgias, nausea, neck pain, numbness, rash, sore throat, swollen glands, urinary symptoms, vertigo, visual change, vomiting or weakness. The symptoms are aggravated by stress (foot pain?). She has tried nothing for the symptoms.   Pain  Chronicity: alec foot pain  Episode onset: 2-3 months ago. The problem occurs constantly. The problem has been gradually worsening. Associated symptoms include joint swelling. Pertinent negatives include no abdominal pain, anorexia, arthralgias, change in bowel habit, chest pain, chills, congestion, coughing, diaphoresis, fatigue, fever, headaches, myalgias, nausea, neck pain, numbness, rash, sore throat, swollen glands, urinary symptoms, vertigo, visual change, vomiting or weakness. Associated symptoms comments: \"burning noted to bottoms of feet\"  . Exacerbated by: walking. Treatments tried: prednisone. The treatment provided no relief.     Objective   Vital Signs:  /82 (BP Location: Right arm, Patient Position: Sitting, Cuff Size: Adult)   Pulse 110   Temp 97.3 °F (36.3 °C) (Temporal)   Resp 18   Ht 170.2 cm (67\")   Wt 88.8 kg (195 lb 12.8 oz)   SpO2 98%   BMI 30.67 kg/m²   Estimated body mass index is 30.67 kg/m² as calculated from the following:    Height as of this encounter: 170.2 cm (67\").    Weight as of this encounter: 88.8 kg (195 lb 12.8 oz).       BMI is >= 30 and <35. (Class 1 Obesity). The following options were " offered after discussion;: weight loss educational material (shared in after visit summary)      Physical Exam  Vitals and nursing note reviewed.   Constitutional:       General: She is awake.      Appearance: Normal appearance.   HENT:      Head: Normocephalic.      Right Ear: Hearing and external ear normal.      Left Ear: Hearing and external ear normal.      Nose: Nose normal.      Mouth/Throat:      Lips: Pink.      Mouth: Mucous membranes are moist.      Pharynx: Oropharynx is clear.   Eyes:      General: Lids are normal.      Conjunctiva/sclera: Conjunctivae normal.      Pupils: Pupils are equal, round, and reactive to light.   Cardiovascular:      Rate and Rhythm: Normal rate and regular rhythm.      Heart sounds: Normal heart sounds.   Pulmonary:      Effort: Pulmonary effort is normal.      Breath sounds: Normal breath sounds.   Abdominal:      General: Abdomen is protuberant. Bowel sounds are normal.      Palpations: Abdomen is soft.      Tenderness: There is no abdominal tenderness.   Musculoskeletal:         General: Normal range of motion.      Cervical back: Normal range of motion and neck supple.      Right ankle: No swelling. Tenderness (anterior) present. Normal range of motion. Normal pulse.      Left ankle: No swelling. Tenderness (anterior/lateral) present. Normal range of motion. Normal pulse.   Skin:     General: Skin is warm and dry.      Capillary Refill: Capillary refill takes less than 2 seconds.   Neurological:      Mental Status: She is alert and oriented to person, place, and time.      Sensory: Sensation is intact.      Motor: Motor function is intact.      Coordination: Coordination is intact.      Gait: Gait is intact.   Psychiatric:         Attention and Perception: Attention and perception normal.         Mood and Affect: Mood and affect normal.         Speech: Speech normal.         Behavior: Behavior normal. Behavior is cooperative.         Thought Content: Thought content  normal.         Cognition and Memory: Cognition normal.         Judgment: Judgment normal.        Result Review :  The following data was reviewed by: EDU Hernandez on 03/13/2023:    Assessment and Plan   Diagnoses and all orders for this visit:    1. Bilateral foot pain (Primary)  -     Ambulatory Referral to Podiatry         I spent 15 minutes caring for Brea on this date of service. This time includes time spent by me in the following activities:preparing for the visit, reviewing tests, obtaining and/or reviewing a separately obtained history, performing a medically appropriate examination and/or evaluation , counseling and educating the patient/family/caregiver, ordering medications, tests, or procedures, referring and communicating with other health care professionals , documenting information in the medical record, independently interpreting results and communicating that information with the patient/family/caregiver and care coordination     Follow Up   Return if symptoms worsen or fail to improve.  Patient was given instructions and counseling regarding her condition or for health maintenance advice. Please see specific information pulled into the AVS if appropriate.       This document has been electronically signed by EDU Hernandez  March 13, 2023 15:57 EDT

## 2023-03-22 ENCOUNTER — SPECIALTY PHARMACY (OUTPATIENT)
Dept: PHARMACY | Facility: HOSPITAL | Age: 43
End: 2023-03-22
Payer: MEDICAID

## 2023-03-22 NOTE — PROGRESS NOTES
Specialty Pharmacy Refill Coordination Note      Name:  Brea Fitzpatrick  :  1980  Date:  3/22/2023         Past Medical History:   Diagnosis Date   • Anemia    • Colon polyp    • Depression    • Fibromyalgia    • Fibromyalgia, primary    • GERD (gastroesophageal reflux disease)    • IBS (irritable bowel syndrome)        Past Surgical History:   Procedure Laterality Date   • APPENDECTOMY     • COLONOSCOPY     • COLONOSCOPY N/A 2022    Procedure: COLONOSCOPY FOR SCREENING;  Surgeon: Jo Lamb MD;  Location: [facility];  Service: Gastroenterology;  Laterality: N/A;   • ENDOSCOPY N/A 2022    Procedure: ESOPHAGOGASTRODUODENOSCOPY WITH BIOPSY;  Surgeon: Jo Lamb MD;  Location: [facility];  Service: Gastroenterology;  Laterality: N/A;   • ROTATOR CUFF REPAIR Right    • TUBAL ABDOMINAL LIGATION     • UPPER GASTROINTESTINAL ENDOSCOPY         Social History     Socioeconomic History   • Marital status:    Tobacco Use   • Smoking status: Former     Packs/day: 0.00     Years: 22.00     Pack years: 0.00     Types: Cigarettes     Quit date: 2023     Years since quittin.2   • Smokeless tobacco: Never   Vaping Use   • Vaping Use: Never used   Substance and Sexual Activity   • Alcohol use: Never   • Drug use: Never   • Sexual activity: Yes     Partners: Male     Birth control/protection: Surgical     Comment: Tubal       Family History   Problem Relation Age of Onset   • Crohn's disease Father    • Depression Mother        No Known Allergies    Current Outpatient Medications   Medication Sig Dispense Refill   • amitriptyline (ELAVIL) 100 MG tablet Take 1 tablet by mouth Every Night.     • ARIPiprazole (Abilify) 10 MG tablet Take 1 tablet by mouth Daily. 30 tablet 0   • calcium polycarbophil (FiberCon) 625 MG tablet Take 2 tablets by mouth Daily. (Patient taking differently: Take 1 tablet by mouth Daily.) 60 tablet 10   • diclofenac (VOLTAREN) 50 MG EC tablet Take 1  tablet by mouth 2 (Two) Times a Day As Needed (back pain). 60 tablet 0   • DULoxetine (CYMBALTA) 60 MG capsule Take 1 capsule by mouth Daily.     • hydrOXYzine (ATARAX) 25 MG tablet Take 1 tablet by mouth At Night As Needed (insomnia). 30 tablet 0   • linaclotide (LINZESS) 290 MCG capsule capsule Take 1 capsule by mouth Every Morning Before Breakfast. (Patient taking differently: Take 1 capsule by mouth Daily As Needed (IBS).) 30 capsule 11   • mesalamine (LIALDA) 1.2 g EC tablet Take 1 tablet by mouth Daily With Breakfast. 30 tablet 11   • nystatin (MYCOSTATIN) 100,000 unit/mL suspension Swish and swallow 5 mL 4 (Four) Times a Day As Needed (oral yeast infection). 473 mL 1   • omeprazole (priLOSEC) 20 MG capsule Take 1 capsule by mouth Daily.     • Rimegepant Sulfate (Nurtec) 75 MG tablet dispersible tablet Take 1 tablet by mouth Daily As Needed (migraine). 30 tablet 11   • Ustekinumab (Stelara) 90 MG/ML solution prefilled syringe Injection Inject 90 mg under the skin into the appropriate area as directed Every 2 (Two) Months. 1 mL 5     No current facility-administered medications for this visit.         ASSESSMENT/PLAN:      Brea is a 43 y.o. female contacted today regarding refills of  Stelara 90mg/ml injection  specialty medication(s).    Reviewed and verified with patient:       Specialty medication(s) and dose(s) confirmed: yes    Refill Questions    Flowsheet Row Most Recent Value   Changes to allergies? No   Changes to medications? No   New conditions since last clinic visit No   Unplanned office visit, urgent care, ED, or hospital admission in the last 4 weeks  No   How does patient/caregiver feel medication is working? Very good   Financial problems or insurance changes  No   Since the previous refill, were any specialty medication doses or scheduled injections missed or delayed?  No   Does this patient require a clinical escalation to a pharmacist? No                     Follow-up: 56 day(s)      Argentina Haque, Pharmacy Technician  Specialty Pharmacy Technician

## 2023-05-19 ENCOUNTER — SPECIALTY PHARMACY (OUTPATIENT)
Dept: PHARMACY | Facility: HOSPITAL | Age: 43
End: 2023-05-19
Payer: MEDICAID

## 2023-06-01 ENCOUNTER — OFFICE VISIT (OUTPATIENT)
Dept: FAMILY MEDICINE CLINIC | Facility: CLINIC | Age: 43
End: 2023-06-01

## 2023-06-01 VITALS
HEART RATE: 93 BPM | WEIGHT: 199 LBS | OXYGEN SATURATION: 99 % | SYSTOLIC BLOOD PRESSURE: 138 MMHG | TEMPERATURE: 97.5 F | DIASTOLIC BLOOD PRESSURE: 85 MMHG | RESPIRATION RATE: 18 BRPM | BODY MASS INDEX: 31.23 KG/M2 | HEIGHT: 67 IN

## 2023-06-01 DIAGNOSIS — F41.9 ANXIETY: ICD-10-CM

## 2023-06-01 DIAGNOSIS — M54.6 PAIN IN THORACIC SPINE: ICD-10-CM

## 2023-06-01 DIAGNOSIS — R73.09 LABILE BLOOD GLUCOSE: ICD-10-CM

## 2023-06-01 DIAGNOSIS — M54.50 ACUTE BILATERAL LOW BACK PAIN WITHOUT SCIATICA: ICD-10-CM

## 2023-06-01 DIAGNOSIS — E66.9 OBESITY (BMI 30-39.9): Primary | ICD-10-CM

## 2023-06-01 RX ORDER — TIZANIDINE 2 MG/1
2 TABLET ORAL NIGHTLY PRN
Qty: 30 TABLET | Refills: 0 | Status: SHIPPED | OUTPATIENT
Start: 2023-06-01

## 2023-06-01 RX ORDER — DULOXETIN HYDROCHLORIDE 60 MG/1
60 CAPSULE, DELAYED RELEASE ORAL 2 TIMES DAILY
Qty: 60 CAPSULE | Refills: 0 | Status: SHIPPED | OUTPATIENT
Start: 2023-06-01

## 2023-06-01 RX ORDER — BLOOD-GLUCOSE METER
1 KIT MISCELLANEOUS DAILY
Qty: 1 EACH | Refills: 0 | Status: SHIPPED | OUTPATIENT
Start: 2023-06-01

## 2023-06-01 RX ORDER — LANCETS 30 GAUGE
1 EACH MISCELLANEOUS DAILY
Qty: 100 EACH | Refills: 0 | Status: SHIPPED | OUTPATIENT
Start: 2023-06-01

## 2023-06-01 RX ORDER — KETOROLAC TROMETHAMINE 30 MG/ML
60 INJECTION, SOLUTION INTRAMUSCULAR; INTRAVENOUS ONCE
Status: COMPLETED | OUTPATIENT
Start: 2023-06-01 | End: 2023-06-01

## 2023-06-01 RX ADMIN — KETOROLAC TROMETHAMINE 60 MG: 30 INJECTION, SOLUTION INTRAMUSCULAR; INTRAVENOUS at 14:50

## 2023-06-01 NOTE — PROGRESS NOTES
"Chief Complaint  Back Pain (Pt states she is also having muscle spasms on right side of lower back, shaking, and weight gain.)    Seema Fitzpatrick presents to [facility] for follow up (back pain).    Back Pain  This is a chronic problem. Episode onset: 6 months or longer. The problem occurs daily. The problem has been gradually worsening since onset. The pain is present in the lumbar spine and thoracic spine. The quality of the pain is described as aching. The pain does not radiate. The pain is at a severity of 4/10. The pain is worse during the night. The symptoms are aggravated by lying down and bending. Associated symptoms include weakness. Pertinent negatives include no abdominal pain, bladder incontinence, bowel incontinence, chest pain, dysuria, fever, headaches, leg pain, numbness, paresis, paresthesias, pelvic pain, perianal numbness, tingling or weight loss. She has tried heat, ice and NSAIDs (ibuprofen; flexaril (from 2021); physical therapy 4-5 weeks) for the symptoms. The treatment provided no relief.   Other  This is a recurrent (\"shakiness\") problem. Episode onset: \"couple of weeks\" The problem occurs intermittently. The problem has been waxing and waning. Associated symptoms include weakness. Pertinent negatives include no abdominal pain, anorexia, arthralgias, change in bowel habit, chest pain, chills, congestion, coughing, diaphoresis, fatigue, fever, headaches, joint swelling, myalgias, nausea, neck pain, numbness, rash, sore throat, swollen glands, urinary symptoms, vertigo, visual change or vomiting. Exacerbated by: unknown. She has tried rest for the symptoms. The treatment provided moderate relief.     Objective   Vital Signs:  /85 (BP Location: Left arm, Patient Position: Sitting, Cuff Size: Adult)   Pulse 93   Temp 97.5 °F (36.4 °C) (Temporal)   Resp 18   Ht 170.2 cm (67\")   Wt 90.3 kg (199 lb)   SpO2 99%   BMI 31.17 kg/m²   Estimated " "body mass index is 31.17 kg/m² as calculated from the following:    Height as of this encounter: 170.2 cm (67\").    Weight as of this encounter: 90.3 kg (199 lb).       BMI is >= 30 and <35. (Class 1 Obesity). The following options were offered after discussion;: weight loss educational material (shared in after visit summary)      Physical Exam  Vitals and nursing note reviewed.   Constitutional:       General: She is awake.      Appearance: Normal appearance. She is obese.   HENT:      Head: Normocephalic.      Right Ear: Hearing and external ear normal.      Left Ear: Hearing and external ear normal.      Nose: Nose normal.      Mouth/Throat:      Lips: Pink.      Mouth: Mucous membranes are moist.   Eyes:      General: Lids are normal.      Conjunctiva/sclera: Conjunctivae normal.      Pupils: Pupils are equal, round, and reactive to light.   Cardiovascular:      Rate and Rhythm: Normal rate.   Pulmonary:      Effort: Pulmonary effort is normal.   Abdominal:      General: Abdomen is protuberant.   Musculoskeletal:      Cervical back: Normal range of motion.      Lumbar back: Spasms and tenderness present. Decreased range of motion.   Skin:     General: Skin is warm and dry.      Capillary Refill: Capillary refill takes less than 2 seconds.   Neurological:      Mental Status: She is alert and oriented to person, place, and time.      Sensory: Sensation is intact.      Motor: Motor function is intact.      Coordination: Coordination is intact.      Gait: Gait is intact.   Psychiatric:         Attention and Perception: Attention and perception normal.         Mood and Affect: Affect normal. Mood is anxious.         Speech: Speech is rapid and pressured.         Behavior: Behavior normal. Behavior is cooperative.         Thought Content: Thought content normal.         Cognition and Memory: Cognition normal.         Judgment: Judgment normal.          Result Review :  The following data was reviewed by: Kym PAYNE" EDU Valentine on 06/01/2023:  CMP        6/29/2022    11:16 7/25/2022    11:50 9/30/2022    10:49   CMP   Glucose 79   95   67     BUN 7   9   7     Creatinine 0.80   0.66   0.70     EGFR 95.7   113.9   112.3     Sodium 138   138   136     Potassium 3.7   3.8   4.0     Chloride 102   101   101     Calcium 9.5   10.1   8.9     Total Protein 6.6   7.1   6.1     Albumin 3.90   4.46   3.70     Globulin 2.7   2.6   2.4     Total Bilirubin 0.2   0.2   <0.2     Alkaline Phosphatase 75   79   71     AST (SGOT) 8   13   17     ALT (SGPT) <5   16   17     Albumin/Globulin Ratio 1.4   1.7   1.5     BUN/Creatinine Ratio 8.8   13.6   10.0     Anion Gap 11.0   13.7   8.0       CBC w/diff        6/29/2022    11:16 9/30/2022    10:49   CBC w/Diff   WBC 7.32   3.87     RBC 4.44   4.00     Hemoglobin 14.1   12.8     Hematocrit 42.1   38.8     MCV 94.8   97.0     MCH 31.8   32.0     MCHC 33.5   33.0     RDW 11.8   13.3     Platelets 317   324     Neutrophil Rel % 73.2   52.2     Immature Granulocyte Rel % 0.3      Lymphocyte Rel % 20.2   33.1     Monocyte Rel % 5.6   10.3     Eosinophil Rel % 0.4   1.8     Basophil Rel % 0.3   0.8       Lipid Panel        7/25/2022    11:50   Lipid Panel   Total Cholesterol 257     Triglycerides 187     HDL Cholesterol 70     VLDL Cholesterol 33     LDL Cholesterol  154     LDL/HDL Ratio 2.14       TSH        7/25/2022    11:50   TSH   TSH 2.400       Pt refuses necessity of further imaging at this time.    Assessment and Plan   Diagnoses and all orders for this visit:    1. Obesity (BMI 30-39.9) (Primary)  -     CBC w AUTO Differential; Future  -     Comprehensive metabolic panel; Future  -     Lipid panel; Future  -     TSH; Future    2. Pain in thoracic spine  -     diclofenac (VOLTAREN) 50 MG EC tablet; Take 1 tablet by mouth 2 (Two) Times a Day As Needed (back pain).  Dispense: 60 tablet; Refill: 0  -     tiZANidine (ZANAFLEX) 2 MG tablet; Take 1 tablet by mouth At Night As Needed for Muscle  Spasms.  Dispense: 30 tablet; Refill: 0  -     ketorolac (TORADOL) injection 60 mg    3. Acute bilateral low back pain without sciatica  -     diclofenac (VOLTAREN) 50 MG EC tablet; Take 1 tablet by mouth 2 (Two) Times a Day As Needed (back pain).  Dispense: 60 tablet; Refill: 0  -     tiZANidine (ZANAFLEX) 2 MG tablet; Take 1 tablet by mouth At Night As Needed for Muscle Spasms.  Dispense: 30 tablet; Refill: 0  -     ketorolac (TORADOL) injection 60 mg    4. Anxiety  -     DULoxetine (CYMBALTA) 60 MG capsule; Take 1 capsule by mouth 2 (Two) Times a Day.  Dispense: 60 capsule; Refill: 0  -     TSH; Future    5. Labile blood glucose  -     glucose blood test strip; 1 each by Other route Daily. Use as instructed  Dispense: 100 each; Refill: 0  -     Lancets misc; 1 Device Daily.  Dispense: 100 each; Refill: 0  -     glucose monitor monitoring kit; 1 each Daily.  Dispense: 1 each; Refill: 0  -     Comprehensive metabolic panel; Future         I spent 30 minutes caring for Brea on this date of service. This time includes time spent by me in the following activities:preparing for the visit, reviewing tests, obtaining and/or reviewing a separately obtained history, performing a medically appropriate examination and/or evaluation , counseling and educating the patient/family/caregiver, ordering medications, tests, or procedures, referring and communicating with other health care professionals , documenting information in the medical record, independently interpreting results and communicating that information with the patient/family/caregiver and care coordination     Follow Up   Return in about 3 months (around 9/1/2023) for Recheck BG levels/shakiness.  Patient was given instructions and counseling regarding her condition or for health maintenance advice. Please see specific information pulled into the AVS if appropriate.       This document has been electronically signed by EDU Hernandez  June 2, 2023 06:16  EDT

## 2023-06-05 DIAGNOSIS — K50.019 CROHN'S DISEASE OF SMALL INTESTINE WITH COMPLICATION: ICD-10-CM

## 2023-06-06 RX ORDER — MESALAMINE 1.2 G/1
1200 TABLET, DELAYED RELEASE ORAL
Qty: 30 TABLET | Refills: 11 | Status: SHIPPED | OUTPATIENT
Start: 2023-06-06

## 2024-01-12 ENCOUNTER — SPECIALTY PHARMACY (OUTPATIENT)
Dept: PHARMACY | Facility: HOSPITAL | Age: 44
End: 2024-01-12

## 2024-01-12 RX ORDER — USTEKINUMAB 90 MG/ML
90 INJECTION, SOLUTION SUBCUTANEOUS
Start: 2024-01-12

## 2024-01-12 NOTE — PROGRESS NOTES
[facility]  Inflammatory Bowel Disease Management       Brea Fitzpatrick is a 43 y.o. female referred by Gastroenterology to the [facility] for Crohn Disease.  The patient's current IBD regimen includes: Stelara 90mg every 2 months and patient reports that she is no longer taking any other medications for her Crohn's. Patient previously tried Humira from 2022-2022 in which was stopped secondary to intolerances including headache and neuropathy in feet. The patient denies any known allergy to components of Stelara or a latex allergy. Patient originally received Stelara infusion on 23.     Pt reports tolerating the Stelara injections well and denies any side effects. She reports that it has helped tremendously with her symptoms. 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 She denies any s/sx of infection or any scaly patches or issues with her skin.    Past Medical History:   Diagnosis Date    Anemia     Colon polyp     Depression     Fibromyalgia     Fibromyalgia, primary     GERD (gastroesophageal reflux disease)     IBS (irritable bowel syndrome)      Social History     Socioeconomic History    Marital status:    Tobacco Use    Smoking status: Former     Packs/day: 0.00     Years: 22.00     Additional pack years: 0.00     Total pack years: 0.00     Types: Cigarettes     Quit date: 2023     Years since quittin.0    Smokeless tobacco: Never   Vaping Use    Vaping Use: Never used   Substance and Sexual Activity    Alcohol use: Never    Drug use: Never    Sexual activity: Yes     Partners: Male     Birth control/protection: Surgical     Comment: Tubal     Patient has no known allergies.    Current Outpatient Medications:     omeprazole (priLOSEC) 20 MG capsule, Take 1 capsule by mouth Daily., Disp: , Rfl:     Ustekinumab (Stelara) 90 MG/ML solution prefilled syringe  Injection, Inject 90 mg under the skin into the appropriate area as directed Every 2 (Two) Months., Disp: , Rfl:     glucose blood test strip, 1 each by Other route Daily. Use as instructed (Patient not taking: Reported on 1/12/2024), Disp: 100 each, Rfl: 0    glucose monitor monitoring kit, 1 each Daily. (Patient not taking: Reported on 1/12/2024), Disp: 1 each, Rfl: 0    Lancets misc, 1 Device Daily. (Patient not taking: Reported on 1/12/2024), Disp: 100 each, Rfl: 0    Rimegepant Sulfate (Nurtec) 75 MG tablet dispersible tablet, Take 1 tablet by mouth Daily As Needed (migraine). (Patient not taking: Reported on 1/12/2024), Disp: 30 tablet, Rfl: 11    There were no vitals filed for this visit.      Labs  WBC   Date Value Ref Range Status   09/30/2022 3.87 3.40 - 10.80 10*3/mm3 Final     RBC   Date Value Ref Range Status   09/30/2022 4.00 3.77 - 5.28 10*6/mm3 Final     Hemoglobin   Date Value Ref Range Status   09/30/2022 12.8 12.0 - 15.9 g/dL Final     Hematocrit   Date Value Ref Range Status   09/30/2022 38.8 34.0 - 46.6 % Final     MCV   Date Value Ref Range Status   09/30/2022 97.0 79.0 - 97.0 fL Final     MCH   Date Value Ref Range Status   09/30/2022 32.0 26.6 - 33.0 pg Final     MCHC   Date Value Ref Range Status   09/30/2022 33.0 31.5 - 35.7 g/dL Final     RDW   Date Value Ref Range Status   09/30/2022 13.3 12.3 - 15.4 % Final     RDW-SD   Date Value Ref Range Status   09/30/2022 48.3 37.0 - 54.0 fl Final     MPV   Date Value Ref Range Status   09/30/2022 10.0 6.0 - 12.0 fL Final     Platelets   Date Value Ref Range Status   09/30/2022 324 140 - 450 10*3/mm3 Final     Neutrophil %   Date Value Ref Range Status   09/30/2022 52.2 42.7 - 76.0 % Final     Lymphocyte %   Date Value Ref Range Status   09/30/2022 33.1 19.6 - 45.3 % Final     Monocyte %   Date Value Ref Range Status   09/30/2022 10.3 5.0 - 12.0 % Final     Eosinophil %   Date Value Ref Range Status   09/30/2022 1.8 0.3 - 6.2 % Final     Basophil %    Date Value Ref Range Status   09/30/2022 0.8 0.0 - 1.5 % Final     Immature Grans %   Date Value Ref Range Status   06/29/2022 0.3 0.0 - 0.5 % Final     Neutrophils, Absolute   Date Value Ref Range Status   09/30/2022 2.02 1.70 - 7.00 10*3/mm3 Final     Lymphocytes, Absolute   Date Value Ref Range Status   09/30/2022 1.28 0.70 - 3.10 10*3/mm3 Final     Monocytes, Absolute   Date Value Ref Range Status   09/30/2022 0.40 0.10 - 0.90 10*3/mm3 Final     Eosinophils, Absolute   Date Value Ref Range Status   09/30/2022 0.07 0.00 - 0.40 10*3/mm3 Final     Basophils, Absolute   Date Value Ref Range Status   09/30/2022 0.03 0.00 - 0.20 10*3/mm3 Final     Immature Grans, Absolute   Date Value Ref Range Status   06/29/2022 0.02 0.00 - 0.05 10*3/mm3 Final     nRBC   Date Value Ref Range Status   06/29/2022 0.0 0.0 - 0.2 /100 WBC Final     Lab Results   Component Value Date    GLUCOSE 67 09/30/2022    BUN 7 09/30/2022    CREATININE 0.70 09/30/2022    EGFR 112.3 09/30/2022    BCR 10.0 09/30/2022    K 4.0 09/30/2022    CO2 27.0 09/30/2022    CALCIUM 8.9 09/30/2022    ALBUMIN 3.70 09/30/2022    BILITOT <0.2 09/30/2022    AST 17 09/30/2022    ALT 17 09/30/2022         Pretreatment Screening  TB- Negative date 7/25/22  HBV - Non-reactive 7/25/22  HCV- Non-reactive 7/25/22      Immunizations Screening   (Live Vaccines Should not be given while on therapy)  COVID 19: Not interested   Influenza: Not intersted  Pneumococcal: Not interested   Zoster: Not interested. Again encouraged patient to speak with Shasta.     Drug-Drug Interactions: No drug-drug interactions expected with Stelara according to literature.        Patient Specific Counseling Points: Pregnancy and Breastfeeding            - Literature recommends for females of child bearing potential to have management of maternal disease optimized prior to pregnancy.          - Information related to the use of Stelara in pregnancy is limited. It is not known if Stelara can harm  your unborn baby. Provider and patient should make clinical decision if Stelara is started.          - If Stelara is decided to be continued between physician and patient agreement, the patient should keep close follow-up especially towards the term of pregnancy for direction on when last injection should be given prior to estimated date of delivery.            -Stelara may be present in breast milk.  The decision to breastfeed during therapy should consider the risk of infant exposure versus the benefits of breast feeding to the infant.       Adherence and Self-Administration  Barriers to Patient Adherence and/or Self-Administration: None  Methods for Supporting Patient Adherence and/or Self-Administration: None Required    Goals of Therapy  Patient Goals of Therapy: Not to miss any doses   Clinical Goals or Therapeutic Targets, If Applicable: Symptom reduction and mucosal healing     Medication Assessment & Plan:     Patient was supposed to follow up with Gastroenterology on 9/11/23 but re-scheduled and then her provider, Shasta Schuster left the practice and therefore, she had to be re-scheduled again. She will be seen on 1/24/24 by a new to her provider, Khadijah Bear. She has not had labs drawn since 9/30/22, before starting Stelara altogether, and is also due for an assessment for TB.     Patient will continue Stelara 90 mg every 8 weeks at this time- next dose due in February. 6 months worth of refills were sent in, authorized by Jo Lamb. Patient is currently getting the medication via [facility].   I reached out to the provider who will be seeing her next, to make her aware of her lab issues/needs and recommended adding a TB screening to labs already ordered. She may have her appointment switched again so the patient can see Dr. Dumont.   Will follow-up on her next appointment to make sure nothing needs to be changed after.  Will follow-up in 6 months for clinical assessment. Care  Coordinator to help manage her refills via [facility].       Darlin Trammell RPH  1/12/2024  09:53 EST

## 2024-01-25 ENCOUNTER — OFFICE VISIT (OUTPATIENT)
Dept: GASTROENTEROLOGY | Facility: CLINIC | Age: 44
End: 2024-01-25
Payer: COMMERCIAL

## 2024-01-25 VITALS
HEIGHT: 67 IN | BODY MASS INDEX: 28.88 KG/M2 | SYSTOLIC BLOOD PRESSURE: 127 MMHG | DIASTOLIC BLOOD PRESSURE: 87 MMHG | HEART RATE: 76 BPM | WEIGHT: 184 LBS

## 2024-01-25 DIAGNOSIS — K21.9 GASTROESOPHAGEAL REFLUX DISEASE WITHOUT ESOPHAGITIS: ICD-10-CM

## 2024-01-25 DIAGNOSIS — R19.7 DIARRHEA, UNSPECIFIED TYPE: ICD-10-CM

## 2024-01-25 DIAGNOSIS — K50.019 CROHN'S DISEASE OF SMALL INTESTINE WITH COMPLICATION: Primary | ICD-10-CM

## 2024-01-25 PROCEDURE — 85025 COMPLETE CBC W/AUTO DIFF WBC: CPT | Performed by: INTERNAL MEDICINE

## 2024-01-25 PROCEDURE — 80053 COMPREHEN METABOLIC PANEL: CPT | Performed by: INTERNAL MEDICINE

## 2024-01-25 PROCEDURE — 86140 C-REACTIVE PROTEIN: CPT | Performed by: INTERNAL MEDICINE

## 2024-01-25 RX ORDER — MONTELUKAST SODIUM 4 MG/1
1 TABLET, CHEWABLE ORAL DAILY
Qty: 30 TABLET | Refills: 2 | Status: SHIPPED | OUTPATIENT
Start: 2024-01-25

## 2024-01-25 RX ORDER — PANTOPRAZOLE SODIUM 40 MG/1
40 TABLET, DELAYED RELEASE ORAL DAILY
Qty: 90 TABLET | Refills: 3 | Status: SHIPPED | OUTPATIENT
Start: 2024-01-25

## 2024-01-25 NOTE — PROGRESS NOTES
"Seema Fitzpatrick is a 43 y.o. female who presents to the office today as a follow up appointment regarding Crohn's Disease      History of Present Illness:    The patient presents today for follow-up Crohn's disease.  She has not been seen in a year.  She was previously followed by VIRGIL Briceno.The diagnosis was based on mildly elevated fecal calprotectin and a CT enterography July 2022 showing small bowel inflammation in the distal ileum.  Previous to this scan, she had had a CT scan of the abdomen and pelvis which also showed circumferential inflammation in the distal ileum.  She does not have biopsy documented Crohn's disease. The patient is currently on Stelara.  She had tried Humira in the past but felt like it was causing peripheral neuropathy.  Her first infusion of Stelara was on 12/08/2022.  I performed a colonoscopy in June 2022.  The colon along with intubation of the terminal ileum was normal. She feels like she is doing well on the Stelara.  She had to miss one dose last year due to lose of benefit but was able to get back on the medication.  She states in the past month or two, her stool has had loose stool.  Generally, she will have 2 to 3 bowel movements with urgency. She denies abdominal pain.  She does complains of nausea since coming off of Abilify.  She has lost about 6 lbs.  No vomiting. She is on Omeprazole for GERD.  If she misses a day, \"I will get really bad heartburn\".  Of note, prior to starting the Laro she was experiencing severe back pain.  Since starting Stelara, her back pain has resolved.          Review of Systems:  Review of Systems   Constitutional:  Negative for activity change, appetite change, chills, fatigue, fever and unexpected weight change.   HENT:  Negative for mouth sores and trouble swallowing.    Eyes:  Negative for photophobia, pain and redness.   Respiratory:  Negative for cough and choking.    Cardiovascular:  Negative for chest pain and leg " swelling.   Gastrointestinal:  Positive for nausea. Negative for abdominal distention, abdominal pain, anal bleeding, constipation, diarrhea, rectal pain and vomiting.   Endocrine: Negative for cold intolerance, heat intolerance and polyphagia.   Genitourinary:  Negative for difficulty urinating and dysuria.   Musculoskeletal:  Negative for arthralgias, joint swelling and myalgias.   Skin:  Negative for rash and wound.   Allergic/Immunologic: Negative for environmental allergies and food allergies.   Neurological:  Negative for dizziness and light-headedness.   Hematological:  Negative for adenopathy. Does not bruise/bleed easily.   Psychiatric/Behavioral:  Negative for sleep disturbance. The patient is not nervous/anxious.        Past Medical History:  Past Medical History:   Diagnosis Date    Anemia     Colon polyp     Depression     Fibromyalgia     Fibromyalgia, primary     GERD (gastroesophageal reflux disease)     IBS (irritable bowel syndrome)        Past Surgical History:  Past Surgical History:   Procedure Laterality Date    APPENDECTOMY      COLONOSCOPY      COLONOSCOPY N/A 6/7/2022    Procedure: COLONOSCOPY FOR SCREENING;  Surgeon: Jo Lamb MD;  Location: [facility];  Service: Gastroenterology;  Laterality: N/A;    ENDOSCOPY N/A 6/7/2022    Procedure: ESOPHAGOGASTRODUODENOSCOPY WITH BIOPSY;  Surgeon: Jo Lamb MD;  Location: [facility];  Service: Gastroenterology;  Laterality: N/A;    ROTATOR CUFF REPAIR Right     TUBAL ABDOMINAL LIGATION      UPPER GASTROINTESTINAL ENDOSCOPY         Family History:  Family History   Problem Relation Age of Onset    Crohn's disease Father     Depression Mother        Social History:  Social History     Socioeconomic History    Marital status:    Tobacco Use    Smoking status: Former     Packs/day: 0.00     Years: 22.00     Additional pack years: 0.00     Total pack years: 0.00     Types: Cigarettes     Quit date: 1/2/2023     Years  "since quittin.0    Smokeless tobacco: Never   Vaping Use    Vaping Use: Never used   Substance and Sexual Activity    Alcohol use: Never    Drug use: Never    Sexual activity: Yes     Partners: Male     Birth control/protection: Surgical     Comment: Tubal       Current Medication List:    Current Outpatient Medications:     Ustekinumab (Stelara) 90 MG/ML solution prefilled syringe Injection, Inject 90 mg under the skin into the appropriate area as directed Every 2 (Two) Months., Disp: , Rfl:     colestipol (COLESTID) 1 g tablet, Take 1 tablet by mouth Daily., Disp: 30 tablet, Rfl: 2    pantoprazole (PROTONIX) 40 MG EC tablet, Take 1 tablet by mouth Daily., Disp: 90 tablet, Rfl: 3    Allergies:   Patient has no known allergies.    Vitals:  /87   Pulse 76   Ht 170.2 cm (67\")   Wt 83.5 kg (184 lb)   BMI 28.82 kg/m²     Physical Exam:  Physical Exam  Constitutional:       Appearance: She is normal weight.   HENT:      Head: Normocephalic and atraumatic.      Nose: Nose normal. No congestion or rhinorrhea.   Eyes:      General: No scleral icterus.     Extraocular Movements: Extraocular movements intact.      Conjunctiva/sclera: Conjunctivae normal.      Pupils: Pupils are equal, round, and reactive to light.   Cardiovascular:      Rate and Rhythm: Normal rate and regular rhythm.      Pulses: Normal pulses.      Heart sounds: Normal heart sounds.   Pulmonary:      Effort: Pulmonary effort is normal.      Breath sounds: Normal breath sounds.   Abdominal:      General: Abdomen is flat. Bowel sounds are normal. There is no distension.      Palpations: Abdomen is soft. There is no shifting dullness, fluid wave, hepatomegaly, splenomegaly, mass or pulsatile mass.      Tenderness: There is no abdominal tenderness. There is no guarding or rebound.      Hernia: No hernia is present.   Musculoskeletal:         General: No swelling or tenderness.      Cervical back: Normal range of motion and neck supple.   Skin:     " General: Skin is warm and dry.      Coloration: Skin is not jaundiced.   Neurological:      General: No focal deficit present.      Mental Status: She is alert and oriented to person, place, and time.   Psychiatric:         Mood and Affect: Mood normal.         Behavior: Behavior normal.         Results Review:  Lab Results:   No visits with results within 1 Month(s) from this visit.   Latest known visit with results is:   Office Visit on 01/16/2023   Component Date Value Ref Range Status    SARS Antigen 01/16/2023 Detected (A)  Not Detected, Presumptive Negative Final    Influenza A Antigen NAVI 01/16/2023 Not Detected  Not Detected Final    Influenza B Antigen NAVI 01/16/2023 Not Detected  Not Detected Final    Internal Control 01/16/2023 Passed  Passed Final    Lot Number 01/16/2023 [id number]   Final    Expiration Date 01/16/2023 02-   Final       Assessment & Plan     Visit Diagnoses:    ICD-10-CM ICD-9-CM   1. Crohn's disease of small intestine with complication  K50.019 555.0   2. Gastroesophageal reflux disease without esophagitis  K21.9 530.81   3. Diarrhea, unspecified type  R19.7 787.91       Plan:  Crohn's disease small intestine: The patient appears to be doing well on Stelara.  She has noted significant improvement in back pain that she was experiencing.  I am going to start her on Colestid to see if this will improve her diarrhea.  She may be getting a bile acid diarrhea from reduced circulation of bile acids in the distal ileum.  I am also going to recheck a fecal calprotectin and CRP today.  I will also check a CBC and CMP.  GERD:  I will switch to Protonix.  She will stop the Omeprazole.   Diarrhea:  Because the terminal ileum is involved, I will try Colestid as she may not be resorbing bile acid.  Her disease may be complicated by bile acid diarrhea.           MEDS ORDERED DURING VISIT:  New Medications Ordered This Visit   Medications    pantoprazole (PROTONIX) 40 MG EC tablet     Sig: Take 1  tablet by mouth Daily.     Dispense:  90 tablet     Refill:  3    colestipol (COLESTID) 1 g tablet     Sig: Take 1 tablet by mouth Daily.     Dispense:  30 tablet     Refill:  2       Return in about 3 months (around 4/25/2024).             This document has been electronically signed by Jo Lamb MD   January 25, 2024 16:36 EST      Part of this note may be an electronic transcription/translation of spoken language to printed text using the Dragon Dictation System.   Home

## 2024-01-26 LAB
ALBUMIN SERPL-MCNC: 4.4 G/DL (ref 3.5–5.2)
ALBUMIN/GLOB SERPL: 1.7 G/DL
ALP SERPL-CCNC: 88 U/L (ref 39–117)
ALT SERPL W P-5'-P-CCNC: 10 U/L (ref 1–33)
ANION GAP SERPL CALCULATED.3IONS-SCNC: 12 MMOL/L (ref 5–15)
AST SERPL-CCNC: 10 U/L (ref 1–32)
BASOPHILS # BLD AUTO: 0.03 10*3/MM3 (ref 0–0.2)
BASOPHILS NFR BLD AUTO: 0.3 % (ref 0–1.5)
BILIRUB SERPL-MCNC: 0.2 MG/DL (ref 0–1.2)
BUN SERPL-MCNC: 4 MG/DL (ref 6–20)
BUN/CREAT SERPL: 5.3 (ref 7–25)
CALCIUM SPEC-SCNC: 9.9 MG/DL (ref 8.6–10.5)
CHLORIDE SERPL-SCNC: 103 MMOL/L (ref 98–107)
CO2 SERPL-SCNC: 23 MMOL/L (ref 22–29)
CREAT SERPL-MCNC: 0.75 MG/DL (ref 0.57–1)
CRP SERPL-MCNC: 0.57 MG/DL (ref 0–0.5)
DEPRECATED RDW RBC AUTO: 44.7 FL (ref 37–54)
EGFRCR SERPLBLD CKD-EPI 2021: 102.7 ML/MIN/1.73
EOSINOPHIL # BLD AUTO: 0.02 10*3/MM3 (ref 0–0.4)
EOSINOPHIL NFR BLD AUTO: 0.2 % (ref 0.3–6.2)
ERYTHROCYTE [DISTWIDTH] IN BLOOD BY AUTOMATED COUNT: 13.2 % (ref 12.3–15.4)
GLOBULIN UR ELPH-MCNC: 2.6 GM/DL
GLUCOSE SERPL-MCNC: 91 MG/DL (ref 65–99)
HCT VFR BLD AUTO: 40.4 % (ref 34–46.6)
HGB BLD-MCNC: 13.8 G/DL (ref 12–15.9)
IMM GRANULOCYTES # BLD AUTO: 0.05 10*3/MM3 (ref 0–0.05)
IMM GRANULOCYTES NFR BLD AUTO: 0.5 % (ref 0–0.5)
LYMPHOCYTES # BLD AUTO: 1.85 10*3/MM3 (ref 0.7–3.1)
LYMPHOCYTES NFR BLD AUTO: 19.2 % (ref 19.6–45.3)
MCH RBC QN AUTO: 31.3 PG (ref 26.6–33)
MCHC RBC AUTO-ENTMCNC: 34.2 G/DL (ref 31.5–35.7)
MCV RBC AUTO: 91.6 FL (ref 79–97)
MONOCYTES # BLD AUTO: 0.52 10*3/MM3 (ref 0.1–0.9)
MONOCYTES NFR BLD AUTO: 5.4 % (ref 5–12)
NEUTROPHILS NFR BLD AUTO: 7.18 10*3/MM3 (ref 1.7–7)
NEUTROPHILS NFR BLD AUTO: 74.4 % (ref 42.7–76)
NRBC BLD AUTO-RTO: 0 /100 WBC (ref 0–0.2)
PLATELET # BLD AUTO: 344 10*3/MM3 (ref 140–450)
PMV BLD AUTO: 10.2 FL (ref 6–12)
POTASSIUM SERPL-SCNC: 3.7 MMOL/L (ref 3.5–5.2)
PROT SERPL-MCNC: 7 G/DL (ref 6–8.5)
RBC # BLD AUTO: 4.41 10*6/MM3 (ref 3.77–5.28)
SODIUM SERPL-SCNC: 138 MMOL/L (ref 136–145)
WBC NRBC COR # BLD AUTO: 9.65 10*3/MM3 (ref 3.4–10.8)

## 2024-01-29 ENCOUNTER — LAB (OUTPATIENT)
Dept: FAMILY MEDICINE CLINIC | Facility: CLINIC | Age: 44
End: 2024-01-29
Payer: COMMERCIAL

## 2024-01-29 DIAGNOSIS — K50.019 CROHN'S DISEASE OF SMALL INTESTINE WITH COMPLICATION: ICD-10-CM

## 2024-01-29 PROCEDURE — 83993 ASSAY FOR CALPROTECTIN FECAL: CPT | Performed by: INTERNAL MEDICINE

## 2024-02-01 LAB — CALPROTECTIN STL-MCNT: 118 UG/G (ref 0–120)

## 2024-02-26 ENCOUNTER — SPECIALTY PHARMACY (OUTPATIENT)
Dept: PHARMACY | Facility: HOSPITAL | Age: 44
End: 2024-02-26

## 2024-04-25 ENCOUNTER — OFFICE VISIT (OUTPATIENT)
Dept: GASTROENTEROLOGY | Facility: CLINIC | Age: 44
End: 2024-04-25
Payer: COMMERCIAL

## 2024-04-25 VITALS
HEART RATE: 84 BPM | WEIGHT: 180.2 LBS | HEIGHT: 67 IN | SYSTOLIC BLOOD PRESSURE: 132 MMHG | BODY MASS INDEX: 28.28 KG/M2 | DIASTOLIC BLOOD PRESSURE: 92 MMHG

## 2024-04-25 DIAGNOSIS — K21.9 GASTROESOPHAGEAL REFLUX DISEASE WITHOUT ESOPHAGITIS: ICD-10-CM

## 2024-04-25 DIAGNOSIS — R19.7 DIARRHEA, UNSPECIFIED TYPE: ICD-10-CM

## 2024-04-25 DIAGNOSIS — K50.019 CROHN'S DISEASE OF SMALL INTESTINE WITH COMPLICATION: Primary | ICD-10-CM

## 2024-04-25 RX ORDER — USTEKINUMAB 90 MG/ML
90 INJECTION, SOLUTION SUBCUTANEOUS
Qty: 1 ML | Refills: 5 | Status: CANCELLED | OUTPATIENT
Start: 2024-04-25

## 2024-04-25 NOTE — PROGRESS NOTES
"Seema Fitzpatrick is a 44 y.o. female who presents to the office today as a follow up appointment regarding Crohn's Disease      History of Present Illness:    The patient presents today for follow-up Crohn's disease.  She has not been seen in a year.  She was previously followed by VIRGIL Briceno.The diagnosis was based on mildly elevated fecal calprotectin and a CT enterography July 2022 showing small bowel inflammation in the distal ileum.  Previous to this scan, she had had a CT scan of the abdomen and pelvis which also showed circumferential inflammation in the distal ileum.  She does not have biopsy documented Crohn's disease. The patient is currently on Stelara.  She had tried Humira in the past but felt like it was causing peripheral neuropathy.  Her first infusion of Stelara was on 12/08/2022.  I performed a colonoscopy in June 2022.  The colon along with intubation of the terminal ileum was normal. She feels like she is doing well on the Stelara.  She had to miss one dose last year due to lose of benefit but was able to get back on the medication.  She states in the past month or two, her stool has had loose stool.  Generally, she will have 2 to 3 bowel movements with urgency. She denies abdominal pain.  She does complains of nausea since coming off of Abilify.  She has lost about 6 lbs.  No vomiting. She is on Omeprazole for GERD.  If she misses a day, \"I will get really bad heartburn\".  Of note, prior to starting the Stelara she was experiencing severe back pain.  Since starting Stelara, her back pain has resolved.    Interval 4/25/2024:  The patient presents for follow up Crohn's disease.  She is now on Stelara but has not gotten the drug sent to her for her next injection that is due tomorrow.  She has not had issues with the injection.  She can tell when it is time for her next injection as she will not increased bowel movements and occasional abdominal pain.  She has lost about 4lbs.  " "She states her appetite is good.  No nausea or vomiting.  Protonix has helped the nausea she was experiencing since her last visit.  She has had some post nasal drip that has caused her to vomiting.    She is having 2 loose bm daily.  She empties well.  No blood in the stool.  She does have fibromyalgia stating \"I get achy from time to time\".  Overall, she is improved \"major improvement - I don't want to go back off of it\".  Diarrhea is improved on Colestid - stool is no longer yellow.  Bowel movements are less frequent.          Review of Systems:  Review of Systems   Constitutional:  Negative for activity change, appetite change, chills, fatigue, fever and unexpected weight change.   HENT:  Negative for mouth sores and trouble swallowing.    Eyes:  Negative for photophobia, pain and redness.   Respiratory:  Negative for cough and choking.    Cardiovascular:  Negative for chest pain and leg swelling.   Gastrointestinal:  Positive for nausea. Negative for abdominal distention, abdominal pain, anal bleeding, constipation, diarrhea, rectal pain and vomiting.   Endocrine: Negative for cold intolerance, heat intolerance and polyphagia.   Genitourinary:  Negative for difficulty urinating and dysuria.   Musculoskeletal:  Negative for arthralgias, joint swelling and myalgias.   Skin:  Negative for rash and wound.   Allergic/Immunologic: Negative for environmental allergies and food allergies.   Neurological:  Negative for dizziness and light-headedness.   Hematological:  Negative for adenopathy. Does not bruise/bleed easily.   Psychiatric/Behavioral:  Negative for sleep disturbance. The patient is not nervous/anxious.        Past Medical History:  Past Medical History:   Diagnosis Date    Anemia     Colon polyp     Depression     Fibromyalgia     Fibromyalgia, primary     GERD (gastroesophageal reflux disease)     IBS (irritable bowel syndrome)        Past Surgical History:  Past Surgical History:   Procedure Laterality " "Date    APPENDECTOMY      COLONOSCOPY      COLONOSCOPY N/A 2022    Procedure: COLONOSCOPY FOR SCREENING;  Surgeon: Jo Lamb MD;  Location: [facility] OR;  Service: Gastroenterology;  Laterality: N/A;    ENDOSCOPY N/A 2022    Procedure: ESOPHAGOGASTRODUODENOSCOPY WITH BIOPSY;  Surgeon: Jo Lamb MD;  Location: [facility] OR;  Service: Gastroenterology;  Laterality: N/A;    ROTATOR CUFF REPAIR Right     TUBAL ABDOMINAL LIGATION      UPPER GASTROINTESTINAL ENDOSCOPY         Family History:  Family History   Problem Relation Age of Onset    Crohn's disease Father     Depression Mother        Social History:  Social History     Socioeconomic History    Marital status:    Tobacco Use    Smoking status: Former     Current packs/day: 0.00     Types: Cigarettes     Quit date: 2001     Years since quittin.3    Smokeless tobacco: Never   Vaping Use    Vaping status: Never Used   Substance and Sexual Activity    Alcohol use: Never    Drug use: Never    Sexual activity: Yes     Partners: Male     Birth control/protection: Surgical     Comment: Tubal       Current Medication List:    Current Outpatient Medications:     colestipol (COLESTID) 1 g tablet, Take 1 tablet by mouth Daily., Disp: 30 tablet, Rfl: 2    pantoprazole (PROTONIX) 40 MG EC tablet, Take 1 tablet by mouth Daily., Disp: 90 tablet, Rfl: 3    Ustekinumab (Stelara) 90 MG/ML solution prefilled syringe Injection, Inject 90 mg under the skin into the appropriate area as directed Every 2 (Two) Months., Disp: , Rfl:     Allergies:   Patient has no known allergies.    Vitals:  /92   Pulse 84   Ht 170.2 cm (67\")   Wt 81.7 kg (180 lb 3.2 oz)   BMI 28.22 kg/m²     Physical Exam:  Physical Exam  Constitutional:       Appearance: She is normal weight.   HENT:      Head: Normocephalic and atraumatic.      Nose: Nose normal. No congestion or rhinorrhea.   Eyes:      General: No scleral icterus.     Extraocular " Movements: Extraocular movements intact.      Conjunctiva/sclera: Conjunctivae normal.      Pupils: Pupils are equal, round, and reactive to light.   Cardiovascular:      Rate and Rhythm: Normal rate and regular rhythm.      Pulses: Normal pulses.      Heart sounds: Normal heart sounds.   Pulmonary:      Effort: Pulmonary effort is normal.      Breath sounds: Normal breath sounds.   Abdominal:      General: Abdomen is flat. Bowel sounds are normal. There is no distension.      Palpations: Abdomen is soft. There is no shifting dullness, fluid wave, hepatomegaly, splenomegaly, mass or pulsatile mass.      Tenderness: There is no abdominal tenderness. There is no guarding or rebound.      Hernia: No hernia is present.   Musculoskeletal:         General: No swelling or tenderness.      Cervical back: Normal range of motion and neck supple.   Skin:     General: Skin is warm and dry.      Coloration: Skin is not jaundiced.   Neurological:      General: No focal deficit present.      Mental Status: She is alert and oriented to person, place, and time.   Psychiatric:         Mood and Affect: Mood normal.         Behavior: Behavior normal.         Results Review:  Lab Results:   No visits with results within 1 Month(s) from this visit.   Latest known visit with results is:   Lab on 01/29/2024   Component Date Value Ref Range Status    Calprotectin, Fecal 01/29/2024 118  0 - 120 ug/g Final    Concentration     Interpretation   Follow-Up  < 5 - 50 ug/g     Normal           None  >50 -120 ug/g     Borderline       Re-evaluate in 4-6 weeks      >120 ug/g     Abnormal         Repeat as clinically                                     indicated       Assessment & Plan     Visit Diagnoses:  No diagnosis found.      Plan:  Crohn's disease small intestine: The patient appears to be doing well on Stelara.  She has no complaints today.  I have been able to call the drug rep and get her samples until I can figure out why her refills have  not been filled.  I have contacted our pharmacy.  GERD:  I will switch to Protonix. Symptoms are improved.    Diarrhea:  Because the terminal ileum is involved, I will try Colestid as she may not be resorbing bile acid.  Her disease may be complicated by bile acid diarrhea. Generally 2 bms daily.  Stool is no longer yellow.    Labs next visit      MEDS ORDERED DURING VISIT:  No orders of the defined types were placed in this encounter.      Return in about 6 months (around 10/25/2024).             This document has been electronically signed by Jo Lamb MD   April 25, 2024 13:47 EDT      Part of this note may be an electronic transcription/translation of spoken language to printed text using the Dragon Dictation System.

## 2024-04-30 ENCOUNTER — SPECIALTY PHARMACY (OUTPATIENT)
Dept: PHARMACY | Facility: HOSPITAL | Age: 44
End: 2024-04-30
Payer: COMMERCIAL

## 2024-04-30 NOTE — PROGRESS NOTES
Specialty Pharmacy Refill Coordination Note     Brea is a 44 y.o. female contacted today regarding refills of Stelara specialty medication(s).    Reviewed and verified with patient: yes  Specialty medication(s) and dose(s) confirmed: yes    Refill Questions      Flowsheet Row Most Recent Value   Changes to allergies? No   Changes to medications? No   New conditions or infections since last clinic visit No   Unplanned office visit, urgent care, ED, or hospital admission in the last 4 weeks  No   How does patient/caregiver feel medication is working? Very good   Financial problems or insurance changes  No   Since the previous refill, were any specialty medication doses or scheduled injections missed or delayed?  No   Does this patient require a clinical escalation to a pharmacist? No                       Follow-up: 60 day(s)     Ana Almaraz, Pharmacy Technician  Specialty Pharmacy Technician

## 2024-06-10 ENCOUNTER — SPECIALTY PHARMACY (OUTPATIENT)
Dept: PHARMACY | Facility: HOSPITAL | Age: 44
End: 2024-06-10
Payer: COMMERCIAL

## 2024-06-10 NOTE — PROGRESS NOTES
[facility]  Inflammatory Bowel Disease Management       Brea Fitzpatrick is a 44 y.o. female referred by Gastroenterology to the [facility] for Crohn's Disease.  The patient's current IBD regimen includes: Stelara 90mg every 2 months and Colestid 1G daily. Patient previously tried Humira from 2022-2022 in which was stopped secondary to intolerances including headache and neuropathy in feet. The patient denies any known allergy to components of Stelara or a latex allergy. Patient originally received Stelara infusion on 23.     Pt continues to report that she is tolerating the Stelara injections well and denies any side effects. She reports that it has helped tremendously with her symptoms. 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 She denies any s/sx of infection or any scaly patches or issues with her skin.    Past Medical History:   Diagnosis Date    Anemia     Colon polyp     Depression     Fibromyalgia     Fibromyalgia, primary     GERD (gastroesophageal reflux disease)     IBS (irritable bowel syndrome)      Social History     Socioeconomic History    Marital status:    Tobacco Use    Smoking status: Former     Current packs/day: 0.00     Types: Cigarettes     Quit date: 2001     Years since quittin.4    Smokeless tobacco: Never   Vaping Use    Vaping status: Never Used   Substance and Sexual Activity    Alcohol use: Never    Drug use: Never    Sexual activity: Yes     Partners: Male     Birth control/protection: Surgical     Comment: Tubal     Patient has no known allergies.    Current Outpatient Medications:     colestipol (COLESTID) 1 g tablet, Take 1 tablet by mouth Daily., Disp: 30 tablet, Rfl: 2    pantoprazole (PROTONIX) 40 MG EC tablet, Take 1 tablet by mouth Daily., Disp: 90 tablet, Rfl: 3    Ustekinumab (Stelara) 90 MG/ML solution prefilled syringe  Injection, Inject 90 mg under the skin into the appropriate area as directed Every 2 (Two) Months., Disp: , Rfl:     There were no vitals filed for this visit.      Labs  WBC   Date Value Ref Range Status   01/25/2024 9.65 3.40 - 10.80 10*3/mm3 Final     RBC   Date Value Ref Range Status   01/25/2024 4.41 3.77 - 5.28 10*6/mm3 Final     Hemoglobin   Date Value Ref Range Status   01/25/2024 13.8 12.0 - 15.9 g/dL Final     Hematocrit   Date Value Ref Range Status   01/25/2024 40.4 34.0 - 46.6 % Final     MCV   Date Value Ref Range Status   01/25/2024 91.6 79.0 - 97.0 fL Final     MCH   Date Value Ref Range Status   01/25/2024 31.3 26.6 - 33.0 pg Final     MCHC   Date Value Ref Range Status   01/25/2024 34.2 31.5 - 35.7 g/dL Final     RDW   Date Value Ref Range Status   01/25/2024 13.2 12.3 - 15.4 % Final     RDW-SD   Date Value Ref Range Status   01/25/2024 44.7 37.0 - 54.0 fl Final     MPV   Date Value Ref Range Status   01/25/2024 10.2 6.0 - 12.0 fL Final     Platelets   Date Value Ref Range Status   01/25/2024 344 140 - 450 10*3/mm3 Final     Neutrophil %   Date Value Ref Range Status   01/25/2024 74.4 42.7 - 76.0 % Final     Lymphocyte %   Date Value Ref Range Status   01/25/2024 19.2 (L) 19.6 - 45.3 % Final     Monocyte %   Date Value Ref Range Status   01/25/2024 5.4 5.0 - 12.0 % Final     Eosinophil %   Date Value Ref Range Status   01/25/2024 0.2 (L) 0.3 - 6.2 % Final     Basophil %   Date Value Ref Range Status   01/25/2024 0.3 0.0 - 1.5 % Final     Immature Grans %   Date Value Ref Range Status   01/25/2024 0.5 0.0 - 0.5 % Final     Neutrophils, Absolute   Date Value Ref Range Status   01/25/2024 7.18 (H) 1.70 - 7.00 10*3/mm3 Final     Lymphocytes, Absolute   Date Value Ref Range Status   01/25/2024 1.85 0.70 - 3.10 10*3/mm3 Final     Monocytes, Absolute   Date Value Ref Range Status   01/25/2024 0.52 0.10 - 0.90 10*3/mm3 Final     Eosinophils, Absolute   Date Value Ref Range Status   01/25/2024 0.02 0.00 -  0.40 10*3/mm3 Final     Basophils, Absolute   Date Value Ref Range Status   01/25/2024 0.03 0.00 - 0.20 10*3/mm3 Final     Immature Grans, Absolute   Date Value Ref Range Status   01/25/2024 0.05 0.00 - 0.05 10*3/mm3 Final     nRBC   Date Value Ref Range Status   01/25/2024 0.0 0.0 - 0.2 /100 WBC Final     Lab Results   Component Value Date    GLUCOSE 91 01/25/2024    BUN 4 (L) 01/25/2024    CREATININE 0.75 01/25/2024    EGFR 102.7 01/25/2024    BCR 5.3 (L) 01/25/2024    K 3.7 01/25/2024    CO2 23.0 01/25/2024    CALCIUM 9.9 01/25/2024    ALBUMIN 4.4 01/25/2024    BILITOT 0.2 01/25/2024    AST 10 01/25/2024    ALT 10 01/25/2024         Pretreatment Screening  TB- Negative date 7/25/22  HBV - Non-reactive 7/25/22  HCV- Non-reactive 7/25/22      Immunizations Screening   (Live Vaccines Should not be given while on therapy)  COVID 19: Not interested   Influenza: Not intersted  Pneumococcal: Not interested   Zoster: Not interested. Again encouraged patient to speak with [facility].     Drug-Drug Interactions: No drug-drug interactions expected with Stelara according to literature.        Patient Specific Counseling Points: Pregnancy and Breastfeeding            - Literature recommends for females of child bearing potential to have management of maternal disease optimized prior to pregnancy.          - Information related to the use of Stelara in pregnancy is limited. It is not known if Stelara can harm your unborn baby. Provider and patient should make clinical decision if Stelara is started.          - If Stelara is decided to be continued between physician and patient agreement, the patient should keep close follow-up especially towards the term of pregnancy for direction on when last injection should be given prior to estimated date of delivery.            -Stelara may be present in breast milk.  The decision to breastfeed during therapy should consider the risk of infant exposure versus the benefits of breast feeding  to the infant.       Adherence and Self-Administration  Barriers to Patient Adherence and/or Self-Administration: None  Methods for Supporting Patient Adherence and/or Self-Administration: None Required    Goals of Therapy  Patient Goals of Therapy: Not to miss any doses   Clinical Goals or Therapeutic Targets, If Applicable: Symptom reduction and mucosal healing     Medication Assessment & Plan:       Patient will continue Stelara 90 mg every 8 weeks at this time- Patient is currently getting the medication via [facility]. Care Coordinator to help manage her refills via [facility].   She will follow-up with Gastro on 10/31/24. At that time, I would recommend a repeat CMP, CBC and TB screening, which are recommended to be assessed every 3-6 months or as clinically relevant while on this medication per [employer]. Will set a reminder closer to appt time to recommend to provider.   Will follow-up in 6 months for clinical assessment.       Darlin Trammell RPH  6/10/2024  11:40 EDT

## 2024-09-24 ENCOUNTER — OFFICE VISIT (OUTPATIENT)
Dept: GASTROENTEROLOGY | Facility: CLINIC | Age: 44
End: 2024-09-24
Payer: COMMERCIAL

## 2024-09-24 VITALS
WEIGHT: 185 LBS | DIASTOLIC BLOOD PRESSURE: 85 MMHG | BODY MASS INDEX: 29.03 KG/M2 | HEART RATE: 68 BPM | SYSTOLIC BLOOD PRESSURE: 131 MMHG | HEIGHT: 67 IN

## 2024-09-24 DIAGNOSIS — K50.019 CROHN'S DISEASE OF SMALL INTESTINE WITH COMPLICATION: Primary | ICD-10-CM

## 2024-09-24 LAB
CRP SERPL-MCNC: 0.87 MG/DL (ref 0–0.5)
ERYTHROCYTE [SEDIMENTATION RATE] IN BLOOD: 28 MM/HR (ref 0–20)

## 2024-09-24 PROCEDURE — 85652 RBC SED RATE AUTOMATED: CPT

## 2024-09-24 PROCEDURE — 99214 OFFICE O/P EST MOD 30 MIN: CPT

## 2024-09-24 PROCEDURE — 86140 C-REACTIVE PROTEIN: CPT

## 2024-09-24 RX ORDER — POLYETHYLENE GLYCOL 3350 17 G/17G
POWDER, FOR SOLUTION ORAL
Qty: 510 G | Refills: 0 | Status: SHIPPED | OUTPATIENT
Start: 2024-09-24

## 2024-09-24 RX ORDER — BISACODYL 5 MG/1
20 TABLET, DELAYED RELEASE ORAL ONCE
Qty: 4 TABLET | Refills: 0 | Status: SHIPPED | OUTPATIENT
Start: 2024-09-24 | End: 2024-09-24

## 2024-09-25 ENCOUNTER — TELEPHONE (OUTPATIENT)
Dept: GASTROENTEROLOGY | Facility: CLINIC | Age: 44
End: 2024-09-25
Payer: COMMERCIAL

## 2024-09-25 ENCOUNTER — LAB (OUTPATIENT)
Dept: FAMILY MEDICINE CLINIC | Facility: CLINIC | Age: 44
End: 2024-09-25
Payer: COMMERCIAL

## 2024-09-25 DIAGNOSIS — K50.019 CROHN'S DISEASE OF SMALL INTESTINE WITH COMPLICATION: ICD-10-CM

## 2024-09-25 PROCEDURE — 83993 ASSAY FOR CALPROTECTIN FECAL: CPT

## 2024-09-25 PROCEDURE — 87205 SMEAR GRAM STAIN: CPT

## 2024-09-26 ENCOUNTER — TELEPHONE (OUTPATIENT)
Dept: UROLOGY | Facility: CLINIC | Age: 44
End: 2024-09-26
Payer: COMMERCIAL

## 2024-09-27 ENCOUNTER — TELEPHONE (OUTPATIENT)
Dept: GASTROENTEROLOGY | Facility: CLINIC | Age: 44
End: 2024-09-27
Payer: COMMERCIAL

## 2024-10-01 LAB — CALPROTECTIN STL-MCNT: 882 UG/G (ref 0–120)

## 2024-10-02 DIAGNOSIS — R19.7 DIARRHEA, UNSPECIFIED TYPE: Primary | ICD-10-CM

## 2024-10-03 ENCOUNTER — TELEPHONE (OUTPATIENT)
Dept: GASTROENTEROLOGY | Facility: CLINIC | Age: 44
End: 2024-10-03
Payer: COMMERCIAL

## 2024-10-03 NOTE — TELEPHONE ENCOUNTER
Caller: Brea Fitzpatrick    Relationship to patient: Self    Best call back number: [phone] (home)       Patient is needing: A CALL FROM [facility] ABOUT TEST RESULTS THAT ARE BACK.  PT STATES SHE HAS NOT BEEN TO USE RESTROOM ALL WEEK.

## 2024-10-04 DIAGNOSIS — K50.019 CROHN'S DISEASE OF SMALL INTESTINE WITH COMPLICATION: Primary | ICD-10-CM

## 2024-10-04 RX ORDER — PREDNISONE 10 MG/1
10 TABLET ORAL TAKE AS DIRECTED
Qty: 62 TABLET | Refills: 0 | Status: SHIPPED | OUTPATIENT
Start: 2024-10-04

## 2024-10-05 ENCOUNTER — HOSPITAL ENCOUNTER (OUTPATIENT)
Dept: CT IMAGING | Facility: HOSPITAL | Age: 44
Discharge: HOME | End: 2024-10-05
Payer: COMMERCIAL

## 2024-10-05 PROCEDURE — 25510000001 IOPAMIDOL 61 % SOLUTION

## 2024-10-05 PROCEDURE — 74178 CT ABD&PLV WO CNTR FLWD CNTR: CPT

## 2024-10-05 RX ORDER — IOPAMIDOL 612 MG/ML
100 INJECTION, SOLUTION INTRAVASCULAR
Status: COMPLETED | OUTPATIENT
Start: 2024-10-05 | End: 2024-10-05

## 2024-10-05 RX ADMIN — IOPAMIDOL 70 ML: 612 INJECTION, SOLUTION INTRAVENOUS at 16:29

## 2024-10-07 LAB
WBC STL QL MICRO: NORMAL
WBC STL QL MICRO: NORMAL

## 2024-10-10 ENCOUNTER — TELEPHONE (OUTPATIENT)
Dept: GASTROENTEROLOGY | Facility: CLINIC | Age: 44
End: 2024-10-10
Payer: COMMERCIAL

## 2024-10-10 NOTE — TELEPHONE ENCOUNTER
Patient to discuss the recent CT results did indicate that she was in a Crohn's disease flare which was expected.  She reports that she is doing much better on prednisone.  States that she is now having diarrhea.  We discussed possibly initiating Colestid every other day if the diarrhea was bothersome.  Patient states that she has been constipated for an extended period of time and is not worried about the diarrhea currently.  She reports that her next Stelara dose is due on October 28 and she is scheduled for scope on October 29.  I have encouraged her to go ahead and take this dose as patient reports that she will lose insurance on November 1 and will not be covered again until December 1.  She verbalized understanding and was amenable to proceeding as above.

## 2024-10-17 PROBLEM — K50.019 CROHN'S DISEASE OF SMALL INTESTINE WITH COMPLICATION: Status: ACTIVE | Noted: 2024-09-24

## 2024-10-29 ENCOUNTER — ANESTHESIA (OUTPATIENT)
Dept: PERIOP | Facility: HOSPITAL | Age: 44
End: 2024-10-29
Payer: COMMERCIAL

## 2024-10-29 ENCOUNTER — ANESTHESIA EVENT (OUTPATIENT)
Dept: PERIOP | Facility: HOSPITAL | Age: 44
End: 2024-10-29
Payer: COMMERCIAL

## 2024-10-29 ENCOUNTER — HOSPITAL ENCOUNTER (OUTPATIENT)
Facility: HOSPITAL | Age: 44
Setting detail: HOSPITAL OUTPATIENT SURGERY
Discharge: HOME | End: 2024-10-29
Attending: INTERNAL MEDICINE | Admitting: INTERNAL MEDICINE
Payer: COMMERCIAL

## 2024-10-29 VITALS
DIASTOLIC BLOOD PRESSURE: 85 MMHG | TEMPERATURE: 97.3 F | SYSTOLIC BLOOD PRESSURE: 125 MMHG | RESPIRATION RATE: 15 BRPM | OXYGEN SATURATION: 99 % | BODY MASS INDEX: 29.03 KG/M2 | HEART RATE: 68 BPM | HEIGHT: 67 IN | WEIGHT: 185 LBS

## 2024-10-29 DIAGNOSIS — K50.019 CROHN'S DISEASE OF SMALL INTESTINE WITH COMPLICATION: ICD-10-CM

## 2024-10-29 LAB
B-HCG UR QL: NEGATIVE
EXPIRATION DATE: NORMAL
INTERNAL NEGATIVE CONTROL: NEGATIVE
INTERNAL POSITIVE CONTROL: POSITIVE
Lab: NORMAL

## 2024-10-29 PROCEDURE — 25010000002 PROPOFOL 200 MG/20ML EMULSION: Performed by: NURSE ANESTHETIST, CERTIFIED REGISTERED

## 2024-10-29 PROCEDURE — 81025 URINE PREGNANCY TEST: CPT | Performed by: ANESTHESIOLOGY

## 2024-10-29 PROCEDURE — 25010000002 LIDOCAINE PF 2% 2 % SOLUTION: Performed by: NURSE ANESTHETIST, CERTIFIED REGISTERED

## 2024-10-29 PROCEDURE — 45380 COLONOSCOPY AND BIOPSY: CPT | Performed by: INTERNAL MEDICINE

## 2024-10-29 PROCEDURE — 25010000002 MIDAZOLAM PER 1 MG: Performed by: NURSE ANESTHETIST, CERTIFIED REGISTERED

## 2024-10-29 RX ORDER — IPRATROPIUM BROMIDE AND ALBUTEROL SULFATE 2.5; .5 MG/3ML; MG/3ML
3 SOLUTION RESPIRATORY (INHALATION) ONCE AS NEEDED
Status: DISCONTINUED | OUTPATIENT
Start: 2024-10-29 | End: 2024-10-29 | Stop reason: HOSPADM

## 2024-10-29 RX ORDER — MIDAZOLAM HYDROCHLORIDE 1 MG/ML
1 INJECTION, SOLUTION INTRAMUSCULAR; INTRAVENOUS
Status: DISCONTINUED | OUTPATIENT
Start: 2024-10-29 | End: 2024-10-29 | Stop reason: HOSPADM

## 2024-10-29 RX ORDER — OXYCODONE AND ACETAMINOPHEN 5; 325 MG/1; MG/1
1 TABLET ORAL ONCE AS NEEDED
Status: DISCONTINUED | OUTPATIENT
Start: 2024-10-29 | End: 2024-10-29 | Stop reason: HOSPADM

## 2024-10-29 RX ORDER — FENTANYL CITRATE 50 UG/ML
50 INJECTION, SOLUTION INTRAMUSCULAR; INTRAVENOUS
Status: DISCONTINUED | OUTPATIENT
Start: 2024-10-29 | End: 2024-10-29 | Stop reason: HOSPADM

## 2024-10-29 RX ORDER — USTEKINUMAB 90 MG/ML
90 INJECTION, SOLUTION SUBCUTANEOUS
Start: 2024-10-29

## 2024-10-29 RX ORDER — MIDAZOLAM HYDROCHLORIDE 1 MG/ML
INJECTION, SOLUTION INTRAMUSCULAR; INTRAVENOUS AS NEEDED
Status: DISCONTINUED | OUTPATIENT
Start: 2024-10-29 | End: 2024-10-29 | Stop reason: SURG

## 2024-10-29 RX ORDER — MEPERIDINE HYDROCHLORIDE 25 MG/ML
12.5 INJECTION INTRAMUSCULAR; INTRAVENOUS; SUBCUTANEOUS
Status: DISCONTINUED | OUTPATIENT
Start: 2024-10-29 | End: 2024-10-29 | Stop reason: HOSPADM

## 2024-10-29 RX ORDER — KETOROLAC TROMETHAMINE 30 MG/ML
30 INJECTION, SOLUTION INTRAMUSCULAR; INTRAVENOUS EVERY 6 HOURS PRN
Status: DISCONTINUED | OUTPATIENT
Start: 2024-10-29 | End: 2024-10-29 | Stop reason: HOSPADM

## 2024-10-29 RX ORDER — ONDANSETRON 2 MG/ML
4 INJECTION INTRAMUSCULAR; INTRAVENOUS AS NEEDED
Status: DISCONTINUED | OUTPATIENT
Start: 2024-10-29 | End: 2024-10-29 | Stop reason: HOSPADM

## 2024-10-29 RX ORDER — SODIUM CHLORIDE 0.9 % (FLUSH) 0.9 %
10 SYRINGE (ML) INJECTION EVERY 12 HOURS SCHEDULED
Status: DISCONTINUED | OUTPATIENT
Start: 2024-10-29 | End: 2024-10-29 | Stop reason: HOSPADM

## 2024-10-29 RX ORDER — PROPOFOL 10 MG/ML
INJECTION, EMULSION INTRAVENOUS AS NEEDED
Status: DISCONTINUED | OUTPATIENT
Start: 2024-10-29 | End: 2024-10-29 | Stop reason: SURG

## 2024-10-29 RX ORDER — SODIUM CHLORIDE 0.9 % (FLUSH) 0.9 %
10 SYRINGE (ML) INJECTION AS NEEDED
Status: DISCONTINUED | OUTPATIENT
Start: 2024-10-29 | End: 2024-10-29 | Stop reason: HOSPADM

## 2024-10-29 RX ORDER — SODIUM CHLORIDE, SODIUM LACTATE, POTASSIUM CHLORIDE, CALCIUM CHLORIDE 600; 310; 30; 20 MG/100ML; MG/100ML; MG/100ML; MG/100ML
125 INJECTION, SOLUTION INTRAVENOUS ONCE
Status: DISCONTINUED | OUTPATIENT
Start: 2024-10-29 | End: 2024-10-29 | Stop reason: HOSPADM

## 2024-10-29 RX ORDER — SODIUM CHLORIDE 9 MG/ML
40 INJECTION, SOLUTION INTRAVENOUS AS NEEDED
Status: ACTIVE | OUTPATIENT
Start: 2024-10-29 | End: 2024-10-29

## 2024-10-29 RX ORDER — LIDOCAINE HYDROCHLORIDE 20 MG/ML
INJECTION, SOLUTION EPIDURAL; INFILTRATION; INTRACAUDAL; PERINEURAL AS NEEDED
Status: DISCONTINUED | OUTPATIENT
Start: 2024-10-29 | End: 2024-10-29 | Stop reason: SURG

## 2024-10-29 RX ADMIN — MIDAZOLAM HYDROCHLORIDE 2 MG: 1 INJECTION, SOLUTION INTRAMUSCULAR; INTRAVENOUS at 12:30

## 2024-10-29 RX ADMIN — PROPOFOL 50 MG: 10 INJECTION, EMULSION INTRAVENOUS at 12:36

## 2024-10-29 RX ADMIN — PROPOFOL 50 MG: 10 INJECTION, EMULSION INTRAVENOUS at 12:48

## 2024-10-29 RX ADMIN — PROPOFOL 100 MG: 10 INJECTION, EMULSION INTRAVENOUS at 12:31

## 2024-10-29 RX ADMIN — PROPOFOL 50 MG: 10 INJECTION, EMULSION INTRAVENOUS at 12:52

## 2024-10-29 RX ADMIN — PROPOFOL 50 MG: 10 INJECTION, EMULSION INTRAVENOUS at 12:45

## 2024-10-29 RX ADMIN — PROPOFOL 50 MG: 10 INJECTION, EMULSION INTRAVENOUS at 12:59

## 2024-10-29 RX ADMIN — PROPOFOL 50 MG: 10 INJECTION, EMULSION INTRAVENOUS at 12:56

## 2024-10-29 RX ADMIN — PROPOFOL 50 MG: 10 INJECTION, EMULSION INTRAVENOUS at 12:43

## 2024-10-29 RX ADMIN — PROPOFOL 50 MG: 10 INJECTION, EMULSION INTRAVENOUS at 12:39

## 2024-10-29 RX ADMIN — LIDOCAINE HYDROCHLORIDE 60 MG: 20 INJECTION, SOLUTION EPIDURAL; INFILTRATION; INTRACAUDAL; PERINEURAL at 12:31

## 2024-10-29 NOTE — ANESTHESIA PREPROCEDURE EVALUATION
" Anesthesia Evaluation     Patient summary reviewed and Nursing notes reviewed   no history of anesthetic complications:   NPO Solid Status: > 8 hours  NPO Liquid Status: > 8 hours           Airway   Mallampati: II  TM distance: >3 FB  Neck ROM: full  No difficulty expected  Dental - normal exam     Pulmonary - normal exam    breath sounds clear to auscultation  (+) a smoker Current, Smoked day of surgery,  (-) asthma  Cardiovascular - negative cardio ROS and normal exam    Rhythm: regular  Rate: normal    (-) past MI      Neuro/Psych  (+) seizures (Childhood until age 14), psychiatric history Anxiety and Depression  (-) CVA  GI/Hepatic/Renal/Endo    (+) GERD    Musculoskeletal     (+) myalgias  Abdominal  - normal exam   Substance History - negative use     OB/GYN negative ob/gyn ROS         Other - negative ROS       ROS/Med Hx Other: Crohn\"s                    Anesthesia Plan    ASA 2     general   total IV anesthesia  intravenous induction     Anesthetic plan, risks, benefits, and alternatives have been provided, discussed and informed consent has been obtained with: patient.        CODE STATUS:       "

## 2024-10-29 NOTE — H&P
[facility] HISTORY AND PHYSICAL    Patient Identification:  Name:  Brea Fitzpatrick  Age:  44 y.o.  Sex:  female  :  1980  MRN:  [id number]   Visit Number:  [id number]  Primary Care Physician:  Kym Valentine APRN     Chief complaint: Crohn's disease    History of presenting illness:  Ms. Fitzpatrick is a 44 y.o. female who presents today for colonoscopy. Patient has Crohn's disease and has been on Stelara since 2022.  Of note, she was previously on Humira which she could not tolerate.  Most recent EGD/colonoscopy was performed in 2022 by Dr. Dumont. At her most recent clinic visit on 2024, patient complained of diarrhea with 1-2 BMs per day with stool type VII on [place] stool scale. She had associated fecal urgency.  Patient denied having bloody stools, melena or rectal bleeding. CT abdomen pelvis enterography was obtained on 10/5/2024 which showed mild medium-length segment wall thickening of the terminal  ileum without significant dilatation or proximal obstruction.Characteristic location for IBD ileitis.  Also noted scattered mesenteric lymph nodes which are nonspecific but can be seen in association with chronic inflammation in the setting of IBD. She was started on prednisone taper which she completed yesterday with improvement.  Fecal calprotectin from 24 was elevated, 882.  At present, she reports having normal BM every other day.  She currently denies having abdominal pain.  She has no new complaints today.    Review of Systems   Constitutional: Negative.    HENT: Negative.     Eyes: Negative.    Respiratory: Negative.     Cardiovascular: Negative.    Gastrointestinal: Negative.    Endocrine: Negative.    Genitourinary: Negative.    Musculoskeletal: Negative.    Allergic/Immunologic: Negative.    Neurological: Negative.    Hematological: Negative.         Past Medical History:   Diagnosis Date    Anemia     Colon polyp     Crohn's disease      Depression     Fibromyalgia     Fibromyalgia, primary     GERD (gastroesophageal reflux disease)     Hernia 2022    IBS (irritable bowel syndrome)      Past Surgical History:   Procedure Laterality Date    APPENDECTOMY      COLONOSCOPY      COLONOSCOPY N/A 2022    Procedure: COLONOSCOPY FOR SCREENING;  Surgeon: Jo Lamb MD;  Location: [facility];  Service: Gastroenterology;  Laterality: N/A;    ENDOSCOPY N/A 2022    Procedure: ESOPHAGOGASTRODUODENOSCOPY WITH BIOPSY;  Surgeon: Jo Lamb MD;  Location: [facility];  Service: Gastroenterology;  Laterality: N/A;    ROTATOR CUFF REPAIR Right     TUBAL ABDOMINAL LIGATION      UPPER GASTROINTESTINAL ENDOSCOPY       Family History   Problem Relation Age of Onset    Crohn's disease Father     Depression Mother      Social History     Socioeconomic History    Marital status:    Tobacco Use    Smoking status: Former     Current packs/day: 0.00     Types: Cigarettes     Quit date: 2001     Years since quittin.8    Smokeless tobacco: Never   Vaping Use    Vaping status: Every Day    Substances: Nicotine, Flavoring   Substance and Sexual Activity    Alcohol use: Never    Drug use: Never    Sexual activity: Yes     Partners: Male     Birth control/protection: Tubal ligation, Surgical     Comment: Tubal       Allergies:  Patient has no known allergies.    Prior to Admission Medications       Prescriptions Last Dose Informant Patient Reported? Taking?    pantoprazole (PROTONIX) 40 MG EC tablet 10/28/2024  No Yes    Take 1 tablet by mouth Daily.    polyethylene glycol (MIRALAX) 17 GM/SCOOP powder 10/28/2024  No Yes    Take 255 g Miralax mixed with 32 oz clear liquid at 8pm night before procedure then repeat 6 hours prior to arrival to hospital.    Ustekinumab (Stelara) 90 MG/ML solution prefilled syringe Injection 10/28/2024  No Yes    Inject 90 mg under the skin into the appropriate area as directed Every 2 (Two)  Months.          Vital Signs:  Temp:  [97.8 °F (36.6 °C)] 97.8 °F (36.6 °C)  Heart Rate:  [88] 88  Resp:  [20] 20  BP: (136)/(98) 136/98      10/29/24  1056   Weight: 83.9 kg (185 lb)     Body mass index is 28.98 kg/m².    Physical Exam:  Constitutional:  Alert and oriented. Well developed and well nourished, in no acute distress.  HENT:  Head: Normocephalic and atraumatic.  Mouth:  Moist mucous membranes.  OP clear, mmm  Eyes:  Conjunctivae and EOM are normal.  Pupils are equal, round, and reactive to light.  No scleral icterus.  Neck:  Neck supple.  No JVD present.    Cardiovascular:  RRR, no MRG.  Pulmonary/Chest:  CTAB, unlabored.   Abdominal:  Soft.  Bowel sounds are normal.  No distension and no tenderness.   Musculoskeletal:  No edema, no tenderness, and no deformity.   Neurological:  MS as above, grossly nonfocal exam     Assessment and Plan:  Proceed with colonoscopy for evaluation of Crohn's disease.    Khadijah Bear, EDU  10/29/24  11:21 EDT

## 2024-10-30 DIAGNOSIS — K90.9 MALABSORPTION OF IRON: Primary | ICD-10-CM

## 2024-10-30 DIAGNOSIS — D50.8 IRON DEFICIENCY ANEMIA SECONDARY TO INADEQUATE DIETARY IRON INTAKE: ICD-10-CM

## 2024-10-30 PROBLEM — D50.9 IRON DEFICIENCY ANEMIA, UNSPECIFIED: Status: ACTIVE | Noted: 2024-10-30

## 2024-10-30 RX ORDER — DIPHENHYDRAMINE HYDROCHLORIDE 50 MG/ML
50 INJECTION INTRAMUSCULAR; INTRAVENOUS AS NEEDED
Status: CANCELLED | OUTPATIENT
Start: 2024-10-30

## 2024-10-30 RX ORDER — FAMOTIDINE 10 MG/ML
20 INJECTION, SOLUTION INTRAVENOUS AS NEEDED
Status: CANCELLED | OUTPATIENT
Start: 2024-10-30

## 2024-10-31 LAB — REF LAB TEST METHOD: NORMAL

## 2024-11-05 NOTE — PROGRESS NOTES
At the time of your recent colonoscopy, biopsies were taken of the terminal ileum.  Biopsies were benign.  We will decrease the interval between your Stelara injections.  Hopefully, we can get approval through your insurance to decrease the interval to every 6 weeks.  Please keep your follow-up appointment.

## 2024-11-14 ENCOUNTER — TELEPHONE (OUTPATIENT)
Dept: PHARMACY | Facility: HOSPITAL | Age: 44
End: 2024-11-14

## 2024-11-14 NOTE — ANESTHESIA POSTPROCEDURE EVALUATION
SAFETY CHECKLIST  ID Bands and Risk clasps correct and in place (DNR, Fall risk, Allergy, Latex, Limb):  Yes  All Lines Reconciled and labeled correctly: Yes  Whiteboard updated:Yes  Environmental interventions: Yes  Verify Tele #: 2     Patient: Brea Fitzpatrick    Procedure Summary       Date: 10/29/24 Room / Location: [facility] OR [address] [place] [address] COR OR    Anesthesia Start: 1230 Anesthesia Stop: 1306    Procedure: COLONOSCOPY Diagnosis:       Crohn's disease of small intestine with complication      (Crohn's disease of small intestine with complication [K50.019])    Surgeons: Jo Lamb MD Provider: Gio Edmonds MD    Anesthesia Type: general ASA Status: 2            Anesthesia Type: general    Vitals  Vitals Value Taken Time   /85 10/29/24 1339   Temp 97.3 °F (36.3 °C) 10/29/24 1309   Pulse 68 10/29/24 1339   Resp 15 10/29/24 1339   SpO2 99 % 10/29/24 1339           Post Anesthesia Care and Evaluation    Patient location during evaluation: PACU  Patient participation: complete - patient participated  Level of consciousness: awake  Pain score: 0  Pain management: satisfactory to patient    Airway patency: patent  Anesthetic complications: No anesthetic complications  PONV Status: none  Cardiovascular status: hemodynamically stable  Respiratory status: nasal cannula  Hydration status: acceptable

## 2024-11-27 ENCOUNTER — SPECIALTY PHARMACY (OUTPATIENT)
Dept: PHARMACY | Facility: HOSPITAL | Age: 44
End: 2024-11-27
Payer: COMMERCIAL

## 2024-11-27 NOTE — PROGRESS NOTES
Specialty Pharmacy Patient Management Program  Gastroenterology Reassessment     Brea Fitzpatrick is a 44 y.o. female referred by their provider, Jaxon Schuster (now seeing Dr. Dumont and Nettie Petersen), to the Gastroenterology Patient Management program offered by [facility] for Inflammatory Bowel Disease.  A follow-up outreach was conducted, including assessment of continued therapy appropriateness, medication adherence, and side effect incidence and management for Stelara (ustekinumab).     The patient's current IBD regimen includes: Stelara     The patient denies any known allergy to components of Stelara or a latex allergy. Patient received Stelara infusion on  12/8/23.     Pt continues to report that she is tolerating the Stelara injections well and denies any side effects. She reports that it has helped tremendously with her symptoms. 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 She denies any s/sx of infection or any scaly patches or issues with her skin. Patient had recent colonoscopy on 10/29/24 and Dr. Dumont changed patient's Stelara to 90 mg every 6 weeks.    Changes to Insurance Coverage or Financial Support  FreeMed    Relevant Past Medical History and Comorbidities  Relevant medical history and concomitant health conditions were discussed with the patient. The patient's chart has been reviewed for relevant past medical history and comorbid health conditions and updated as necessary.   Past Medical History:   Diagnosis Date    Anemia     Colon polyp     Crohn's disease 2022    Depression     Fibromyalgia     Fibromyalgia, primary     GERD (gastroesophageal reflux disease)     Hernia June 2022    IBS (irritable bowel syndrome)      Social History     Socioeconomic History    Marital status:    Tobacco Use    Smoking status: Former     Current packs/day: 0.00      Types: Cigarettes     Quit date: 2001     Years since quittin.9    Smokeless tobacco: Never   Vaping Use    Vaping status: Every Day    Substances: Nicotine, Flavoring   Substance and Sexual Activity    Alcohol use: Never    Drug use: Never    Sexual activity: Yes     Partners: Male     Birth control/protection: Tubal ligation, Surgical     Comment: Tubal     Problem list reviewed by Lilly Morales PharmD on 2024 at  3:31 PM    Allergies  Known allergies and reactions were discussed with the patient. The patient's chart has been reviewed for allergy information and updated as necessary.   Patient has no known allergies.  Allergies reviewed by Lilly Morales PharmD on 2024 at  3:31 PM    Relevant Laboratory Values  Lab Results   Component Value Date    GLUCOSE 91 2024    BUN 4 (L) 2024    CREATININE 0.75 2024    BCR 5.3 (L) 2024     2024    K 3.7 2024     2024    CO2 23.0 2024    CALCIUM 9.9 2024    PROTEINTOT 7.0 2024    ALBUMIN 4.4 2024    ALT 10 2024    AST 10 2024    ALKPHOS 88 2024    BILITOT 0.2 2024    ANIONGAP 12.0 2024     Lab Results   Component Value Date    WBC 9.65 2024    HGB 13.8 2024    HCT 40.4 2024     2024     Lab Results   Component Value Date    HEPAIGM Non-Reactive 2022    HEPBIGMCORE Non-Reactive 2022    HEPBSAG Non-Reactive 2022    HEPCVIRUSABY Non-Reactive 2022     Lab Results   Component Value Date    QUANTITBGLDP Negative 2022       Lab Value Review  The above lab values have been reviewed; see plan for specialty medication(s) dose adjustment(s) or recommendations.    Immunizations Screening     (Live Vaccines Should not be given while on therapy or within 4 weeks of starting therapy)  (You should not receive the BCG vaccine during the one year before receiving STELARA® or one year after you stop  receiving STELARA®)  COVID 19: Declined  Influenza: Declined  Pneumococcal: Declined  Zoster: Declined    Current Medication List  This medication list has been reviewed with the patient and evaluated for any interactions or necessary modifications/recommendations, and updated to include all prescription medications, OTC medications, and supplements the patient is currently taking. This list reflects what is contained in the patient's profile, which has also been marked as reviewed to communicate to other providers it is the most up to date version of the patient's current medication therapy.     Current Outpatient Medications:     pantoprazole (PROTONIX) 40 MG EC tablet, Take 1 tablet by mouth Daily., Disp: 90 tablet, Rfl: 3    Ustekinumab (Stelara) 90 MG/ML solution prefilled syringe Injection, Inject 90 mg under the skin into the appropriate area as directed Every 6 (Six) Weeks., Disp: , Rfl:   No current facility-administered medications for this visit.  Medicines reviewed by Lilly Morales, PharmD on 11/27/2024 at  3:31 PM    Drug Interactions  none     Adverse Drug Reactions  Adverse Reactions Experienced: none  Plan for ADR Management: N/A     Hospitalizations and Urgent Care Since Last Assessment  Hospitalizations or Admissions: none  ED Visits: none  Urgent Office Visits: none     Adherence and Self-Administration  Approximate Number of Doses Missed Since Last Assessment: none  Ongoing or New Barriers to Patient Adherence and/or Self-Administration: none   Methods for Supporting Patient Adherence and/or Self-Administration: N/A     Recently Close Medication Therapy Problems  No medication therapy recommendations to display    Goals of Therapy  Goals related to the patient's specialty therapy were discussed with the patient. The Patient Goals segment of this outreach has been reviewed and updated.   Goals Addressed Today        Specialty Pharmacy General Goal      Reduce clinical symptoms by at least 50% from  baseline              Quality of Life Assessment   Quality of Life related to the patient's specialty therapy was discussed with the patient. The QOL segment of this outreach has been reviewed and updated.   Quality of Life Assessment  Quality of Life Improvement Scale: 9-A good deal better    Reassessment Plan & Follow-Up  Medication Therapy Changes: Patient will continue Stelara 90 mg every 6 weeks.  Additional Plans, Therapy Recommendations, or Therapy Problems to Be Addressed: none   Pharmacist to perform regular reassessments no more than (6) months from the previous assessment.  Care Coordinator to set up future refill outreaches, coordinate prescription delivery, and escalate clinical questions to pharmacist.     Attestation  Therapeutic appropriateness: Appropriate   I attest the patient was actively involved in and has agreed to the above plan of care. I attest that the specialty medication(s) addressed above are appropriate for the patient based on my reassessment. If the prescribed therapy is at any point deemed not appropriate based on the current or future assessments, a consultation will be initiated with the patient's specialty care provider to determine the best course of action. The revised plan of therapy will be documented along with any required assessments and/or additional patient education provided.     Lilly Morales, PharmD  11/27/24 15:32 EST

## 2024-12-13 ENCOUNTER — OFFICE VISIT (OUTPATIENT)
Dept: GASTROENTEROLOGY | Facility: CLINIC | Age: 44
End: 2024-12-13
Payer: COMMERCIAL

## 2024-12-13 VITALS
HEART RATE: 90 BPM | DIASTOLIC BLOOD PRESSURE: 91 MMHG | SYSTOLIC BLOOD PRESSURE: 138 MMHG | BODY MASS INDEX: 30.83 KG/M2 | HEIGHT: 67 IN | WEIGHT: 196.4 LBS

## 2024-12-13 DIAGNOSIS — K50.019 CROHN'S DISEASE OF SMALL INTESTINE WITH COMPLICATION: ICD-10-CM

## 2024-12-13 DIAGNOSIS — K21.9 GASTROESOPHAGEAL REFLUX DISEASE WITHOUT ESOPHAGITIS: Primary | ICD-10-CM

## 2024-12-13 RX ORDER — PANTOPRAZOLE SODIUM 40 MG/1
40 TABLET, DELAYED RELEASE ORAL
Qty: 60 TABLET | Refills: 5 | Status: SHIPPED | OUTPATIENT
Start: 2024-12-13

## 2024-12-13 NOTE — PROGRESS NOTES
"Chief Complaint  Crohn's Disease    Brea Fitzpatrick is a 44 y.o. female who presents today to [facility] for Crohn's Disease.    HPI:   44-year-old female with PMH of Crohn's disease previously followed by Jaxon Schuster PA-C,  and more recently, Dr. Dumont, see documentation for prior management.  CT enterography in July 2022 showing small bowel inflammation in the distal ileum.  She had a mildly elevated calprotectin.  Prior to CT enterography, she had a ED abdomen pelvis that documented circumferential inflammation of the distal ileum.  She does not have a biopsy confirming Crohn's disease.  Patient trialed Humira in the past but felt like this was causing peripheral neuropathy.  Her first Stelara infusion was 12/8/2022.  EGD/colonoscopy was performed in June 2022 by Dr. Dumont.  This was notable for normal esophagus, small hiatal hernia, patchy mild erythematous mucosa without bleeding in the gastric antrum, normal duodenum.  Colonoscopy was notable for 4 mm polyp in the cecum, 2 sessile polyps in the sigmoid colon, internal hemorrhoids, normal terminal ileum.  Gastric antrum biopsy was notable for mild chronic gastritis without intestinal metaplasia or dysplasia.  Esophagus biopsy notable for mild chronic esophagitis with minute fragments of benign glandular.  Cecum polyp was notable for benign colonic mucosa with no definitive histopathologic abnormalities.  Sigmoid  polyps were hyperplastic x 2.     9/24/2024: Today, patient reports that she is having issues with bloating after eating.  States that after each meal she typically has immediate bloating and then within 2-3 hours she has to run to the bathroom.  She states that when she has a bowel movement it is very \"forceful and pressurized diarrhea.\"  She does not have blood, melena, or mucus in her stools.  She states that the stool is typically yellow with \"little floating pieces.\" This is very painful for " her.  He states this has been ongoing for the last 2-3 weeks.  She reports having at least 1-2 bowel movements every day that she rates as BSS 7.  She has discontinued soda and carbonated beverages and started drinking more water.  However, this is not alleviated her symptoms.  She notes chronic nausea and 1 episode of vomiting.  She states that she has indigestion in her midesophagus.  She denies dysphagia.  However, she does have a full sensation in her midesophagus.     Of note, her last Stelara injection was on August 28 and her next Stelara injection is scheduled for October 28.  Lab work was obtained.  This was notable for an elevated ESR and CRP.  Fecal calprotectin was elevated as well.  Patient was subsequently placed on 1 month steroid taper per Dr. Dumont.  Unfortunately C. difficile of and GI panels were never collected.  Colonoscopy was performed on 10/29/2024 by Dr. Dumont.  This was notable for normal colon, internal hemorrhoids, terminal ileum was normal, mild scarring of the ileocecal valve.  Plan moving forward is to increase Stelara dosing to every 6 weeks instead of current every 8 weeks.         Interval History:    Today, patient presents for follow-up.  She states that she has been doing better.  She is no longer having diarrhea, bloating, or abdominal pain.  She is still waiting for Stelara dose to be approved by insurance.  Otherwise, she states that she is doing well.  She notes having more frequent acid reflux.  Currently she is having acid reflux 3-4 times per week on Protonix 40 mg once per day.  On last visit patient was having issues with dysphagia, however this has resolved as well.  She has no other complaints today.    Previous History:   Past Medical History:   Diagnosis Date    Anemia     Colon polyp     Crohn's disease 2022    Depression     Fibromyalgia     Fibromyalgia, primary     GERD (gastroesophageal reflux disease)     Hernia June 2022    IBS (irritable bowel syndrome)   "     Past Surgical History:   Procedure Laterality Date    APPENDECTOMY      COLONOSCOPY      COLONOSCOPY N/A 2022    Procedure: COLONOSCOPY FOR SCREENING;  Surgeon: Jo Lamb MD;  Location: [facility] OR;  Service: Gastroenterology;  Laterality: N/A;    COLONOSCOPY N/A 10/29/2024    Procedure: COLONOSCOPY;  Surgeon: Jo Lamb MD;  Location: [facility] OR;  Service: Gastroenterology;  Laterality: N/A;    ENDOSCOPY N/A 2022    Procedure: ESOPHAGOGASTRODUODENOSCOPY WITH BIOPSY;  Surgeon: Jo Lamb MD;  Location: [facility] OR;  Service: Gastroenterology;  Laterality: N/A;    ROTATOR CUFF REPAIR Right     TUBAL ABDOMINAL LIGATION      UPPER GASTROINTESTINAL ENDOSCOPY        Social History     Socioeconomic History    Marital status:    Tobacco Use    Smoking status: Former     Current packs/day: 0.00     Types: Cigarettes     Quit date: 2001     Years since quittin.9    Smokeless tobacco: Never   Vaping Use    Vaping status: Every Day    Substances: Nicotine, Flavoring   Substance and Sexual Activity    Alcohol use: Never    Drug use: Never    Sexual activity: Yes     Partners: Male     Birth control/protection: Tubal ligation, Surgical     Comment: Tubal        Current Medications:  Current Outpatient Medications   Medication Sig Dispense Refill    tiZANidine (ZANAFLEX) 4 MG tablet Every 8 (Eight) Hours.      Ustekinumab (Stelara) 90 MG/ML solution prefilled syringe Injection Inject 90 mg under the skin into the appropriate area as directed Every 6 (Six) Weeks.      pantoprazole (Protonix) 40 MG EC tablet Take 1 tablet by mouth 2 (Two) Times a Day Before Meals. 60 tablet 5     No current facility-administered medications for this visit.       Allergies:   No Known Allergies    Vitals:   /91   Pulse 90   Ht 170.2 cm (67\")   Wt 89.1 kg (196 lb 6.4 oz)   BMI 30.76 kg/m²   Estimated body mass index is 30.76 kg/m² as calculated from the " "following:    Height as of this encounter: 170.2 cm (67\").    Weight as of this encounter: 89.1 kg (196 lb 6.4 oz).    ROS:   Review of Systems   Constitutional: Negative.    HENT: Negative.     Respiratory: Negative.     Cardiovascular: Negative.    Gastrointestinal:  Negative for abdominal distention, abdominal pain, anal bleeding, blood in stool, constipation, diarrhea, nausea, rectal pain and vomiting.        + GERD   All other systems reviewed and are negative.       Physical Exam:   Physical Exam  Vitals reviewed.   Constitutional:       General: She is not in acute distress.     Appearance: Normal appearance. She is well-groomed. She is obese. She is not toxic-appearing.   HENT:      Head: Normocephalic and atraumatic.      Mouth/Throat:      Mouth: Mucous membranes are moist.   Eyes:      Extraocular Movements: Extraocular movements intact.   Cardiovascular:      Rate and Rhythm: Normal rate and regular rhythm.      Heart sounds: Normal heart sounds. No murmur heard.  Pulmonary:      Effort: Pulmonary effort is normal. No respiratory distress.      Breath sounds: Normal breath sounds. No stridor. No wheezing or rales.   Abdominal:      General: Bowel sounds are normal. There is no distension.      Palpations: Abdomen is soft. There is no mass.      Tenderness: There is no abdominal tenderness. There is no guarding or rebound.      Hernia: No hernia is present.   Skin:     General: Skin is warm.      Coloration: Skin is not jaundiced.      Findings: No rash.   Neurological:      Mental Status: She is alert and oriented to person, place, and time.   Psychiatric:         Mood and Affect: Mood and affect normal.         Speech: Speech normal.         Behavior: Behavior normal. Behavior is cooperative.         Thought Content: Thought content normal.          Lab Results:   Lab Results   Component Value Date    WBC 9.65 01/25/2024    HGB 13.8 01/25/2024    HCT 40.4 01/25/2024    MCV 91.6 01/25/2024    RDW 13.2 " 01/25/2024     01/25/2024    NEUTRORELPCT 74.4 01/25/2024    LYMPHORELPCT 19.2 (L) 01/25/2024    MONORELPCT 5.4 01/25/2024    EOSRELPCT 0.2 (L) 01/25/2024    BASORELPCT 0.3 01/25/2024    NEUTROABS 7.18 (H) 01/25/2024    LYMPHSABS 1.85 01/25/2024       Lab Results   Component Value Date     01/25/2024    K 3.7 01/25/2024    CO2 23.0 01/25/2024     01/25/2024    BUN 4 (L) 01/25/2024    CREATININE 0.75 01/25/2024    GLUCOSE 91 01/25/2024    CALCIUM 9.9 01/25/2024    ALKPHOS 88 01/25/2024    AST 10 01/25/2024    ALT 10 01/25/2024    BILITOT 0.2 01/25/2024    ALBUMIN 4.4 01/25/2024    PROTEINTOT 7.0 01/25/2024       Pathology:        Endoscopy:        Imaging:   CT Abdomen Pelvis With & Without Contrast Enterography    Result Date: 10/6/2024  1.  No CT evidence of bowel fistula or abscess. 2.  There is mild medium-length segment wall thickening of the terminal ileum without significant dilatation or proximal obstruction. Characteristic location for IBD ileitis. 3.  Scattered mesenteric lymph nodes which are nonspecific but can be seen in association with chronic inflammation in the setting of IBD. 4.  Appendectomy. 5. No significant free fluid. No free air. 6. Other incidental/nonacute findings above.   This report was finalized on 10/6/2024 9:02 AM by Dr. Miguel Moore MD.         Results review: During today's encounter, all relevant clinical data has been reviewed.      Assessment and Plan  1. Gastroesophageal reflux disease without esophagitis (Primary)  Discussed management for GERD: encouraged weight loss, HOB elevation at night, avoidance of meals 2-3 hours before bedtime, avoid trigger foods (caffeine, alcohol, chocolate, acidic/spicy foods e.g oranges/tomatoes), and encouraged smoking cessation  Will increase Protonix 40 mg twice daily x 8 weeks.  -     pantoprazole (Protonix) 40 MG EC tablet; Take 1 tablet by mouth 2 (Two) Times a Day Before Meals.  Dispense: 60 tablet; Refill: 5    2.  Crohn's disease of small intestine with complication  Currently well-controlled.  Still awaiting insurance approval for Stelara dosing every 6 weeks.    New Medications:   New Medications Ordered This Visit   Medications    pantoprazole (Protonix) 40 MG EC tablet     Sig: Take 1 tablet by mouth 2 (Two) Times a Day Before Meals.     Dispense:  60 tablet     Refill:  5       Discontinued Medications:   Medications Discontinued During This Encounter   Medication Reason    pantoprazole (PROTONIX) 40 MG EC tablet Not Efficacious        Visit Diagnoses:    ICD-10-CM ICD-9-CM   1. Gastroesophageal reflux disease without esophagitis  K21.9 530.81   2. Crohn's disease of small intestine with complication  K50.019 555.0            Follow Up:   No follow-ups on file.    The patient was in agreement with the plan and all questions were answered to patient's satisfaction.        This document has been electronically signed by Nettie Petersen PA-C   December 13, 2024 15:35 EST    Dictated Utilizing Dragon Dictation: Part of this note may be an electronic transcription/translation of spoken language to printed text using the Dragon Dictation System.    CC:  No ref. provider found  Kym Valentine APRN

## 2024-12-30 ENCOUNTER — TELEPHONE (OUTPATIENT)
Dept: PHARMACY | Facility: HOSPITAL | Age: 44
End: 2024-12-30

## 2025-01-02 ENCOUNTER — TELEPHONE (OUTPATIENT)
Dept: GASTROENTEROLOGY | Facility: CLINIC | Age: 45
End: 2025-01-02
Payer: COMMERCIAL

## 2025-01-02 DIAGNOSIS — K50.019 CROHN'S DISEASE OF SMALL INTESTINE WITH COMPLICATION: Primary | ICD-10-CM

## 2025-01-02 RX ORDER — CERTOLIZUMAB PEGOL 200 MG/ML
400 INJECTION, SOLUTION SUBCUTANEOUS
Qty: 3 EACH | Refills: 0 | Status: CANCELLED | OUTPATIENT
Start: 2025-01-02

## 2025-01-02 NOTE — TELEPHONE ENCOUNTER
Called patient to discuss change in treatment regimen.  Unfortunately, patient's insurance did not cover increase in Stelara from 8 weeks to 6 weeks.  Also, insurance does not cover [facility] clinic.  Patient's medications will need to be sent through [facility] specialty pharmacy.  I did discuss this with Dr. Dumont as she was previously managing patient.  Per Dr. Dumont, will initiate patient on Cimzia 400 mg induction dose.  Patient will receive injection in office at weeks 0.  We will monitor patient for 30 minutes after initial dose for reaction.  Patient will subsequently receive another induction dose at weeks 2 and 4.  After, patient will receive maintenance dosing 400 mg every 4 weeks.  Discussed this with patient who was amenable with proceeding as above.  Encouraged patient to keep all follow-up appointments.  Rx has been faxed to [facility] specialty pharmacy fax number as provided in message by Ana Almaraz pharmacy technician.

## 2025-01-03 RX ORDER — CERTOLIZUMAB PEGOL 200 MG/ML
400 INJECTION, SOLUTION SUBCUTANEOUS
Qty: 2 ML | Refills: 5 | Status: SHIPPED | OUTPATIENT
Start: 2025-02-28

## 2025-01-03 RX ORDER — CERTOLIZUMAB PEGOL 200 MG/ML
400 INJECTION, SOLUTION SUBCUTANEOUS
Qty: 6 ML | Refills: 0 | Status: SHIPPED | OUTPATIENT
Start: 2025-01-03 | End: 2025-02-01

## 2025-01-09 ENCOUNTER — TELEPHONE (OUTPATIENT)
Dept: GASTROENTEROLOGY | Facility: CLINIC | Age: 45
End: 2025-01-09
Payer: COMMERCIAL

## 2025-02-07 ENCOUNTER — OFFICE VISIT (OUTPATIENT)
Dept: GASTROENTEROLOGY | Facility: CLINIC | Age: 45
End: 2025-02-07
Payer: COMMERCIAL

## 2025-02-07 ENCOUNTER — LAB (OUTPATIENT)
Dept: LAB | Facility: HOSPITAL | Age: 45
End: 2025-02-07
Payer: COMMERCIAL

## 2025-02-07 VITALS
HEIGHT: 67 IN | SYSTOLIC BLOOD PRESSURE: 131 MMHG | WEIGHT: 195.6 LBS | BODY MASS INDEX: 30.7 KG/M2 | HEART RATE: 83 BPM | DIASTOLIC BLOOD PRESSURE: 88 MMHG

## 2025-02-07 DIAGNOSIS — K50.019 CROHN'S DISEASE OF SMALL INTESTINE WITH COMPLICATION: Primary | ICD-10-CM

## 2025-02-07 LAB
ALBUMIN SERPL-MCNC: 3.5 G/DL (ref 3.5–5.2)
ALBUMIN/GLOB SERPL: 1.1 G/DL
ALP SERPL-CCNC: 86 U/L (ref 39–117)
ALT SERPL W P-5'-P-CCNC: 8 U/L (ref 1–33)
ANION GAP SERPL CALCULATED.3IONS-SCNC: 10 MMOL/L (ref 5–15)
AST SERPL-CCNC: 11 U/L (ref 1–32)
BASOPHILS # BLD AUTO: 0.02 10*3/MM3 (ref 0–0.2)
BASOPHILS NFR BLD AUTO: 0.3 % (ref 0–1.5)
BILIRUB SERPL-MCNC: <0.2 MG/DL (ref 0–1.2)
BUN SERPL-MCNC: 10 MG/DL (ref 6–20)
BUN/CREAT SERPL: 13.2 (ref 7–25)
CALCIUM SPEC-SCNC: 9.2 MG/DL (ref 8.6–10.5)
CHLORIDE SERPL-SCNC: 104 MMOL/L (ref 98–107)
CO2 SERPL-SCNC: 26 MMOL/L (ref 22–29)
CREAT SERPL-MCNC: 0.76 MG/DL (ref 0.57–1)
DEPRECATED RDW RBC AUTO: 40.9 FL (ref 37–54)
EGFRCR SERPLBLD CKD-EPI 2021: 99.9 ML/MIN/1.73
EOSINOPHIL # BLD AUTO: 0.05 10*3/MM3 (ref 0–0.4)
EOSINOPHIL NFR BLD AUTO: 0.8 % (ref 0.3–6.2)
ERYTHROCYTE [DISTWIDTH] IN BLOOD BY AUTOMATED COUNT: 12.2 % (ref 12.3–15.4)
GLOBULIN UR ELPH-MCNC: 3.1 GM/DL
GLUCOSE SERPL-MCNC: 94 MG/DL (ref 65–99)
HCT VFR BLD AUTO: 37.6 % (ref 34–46.6)
HGB BLD-MCNC: 12.4 G/DL (ref 12–15.9)
IMM GRANULOCYTES # BLD AUTO: 0.05 10*3/MM3 (ref 0–0.05)
IMM GRANULOCYTES NFR BLD AUTO: 0.8 % (ref 0–0.5)
LYMPHOCYTES # BLD AUTO: 1.56 10*3/MM3 (ref 0.7–3.1)
LYMPHOCYTES NFR BLD AUTO: 23.6 % (ref 19.6–45.3)
MCH RBC QN AUTO: 30.2 PG (ref 26.6–33)
MCHC RBC AUTO-ENTMCNC: 33 G/DL (ref 31.5–35.7)
MCV RBC AUTO: 91.7 FL (ref 79–97)
MONOCYTES # BLD AUTO: 0.36 10*3/MM3 (ref 0.1–0.9)
MONOCYTES NFR BLD AUTO: 5.4 % (ref 5–12)
NEUTROPHILS NFR BLD AUTO: 4.57 10*3/MM3 (ref 1.7–7)
NEUTROPHILS NFR BLD AUTO: 69.1 % (ref 42.7–76)
NRBC BLD AUTO-RTO: 0 /100 WBC (ref 0–0.2)
PLATELET # BLD AUTO: 330 10*3/MM3 (ref 140–450)
PMV BLD AUTO: 9.3 FL (ref 6–12)
POTASSIUM SERPL-SCNC: 3.7 MMOL/L (ref 3.5–5.2)
PROT SERPL-MCNC: 6.6 G/DL (ref 6–8.5)
RBC # BLD AUTO: 4.1 10*6/MM3 (ref 3.77–5.28)
SODIUM SERPL-SCNC: 140 MMOL/L (ref 136–145)
WBC NRBC COR # BLD AUTO: 6.61 10*3/MM3 (ref 3.4–10.8)

## 2025-02-07 PROCEDURE — 99213 OFFICE O/P EST LOW 20 MIN: CPT

## 2025-02-07 PROCEDURE — 80053 COMPREHEN METABOLIC PANEL: CPT

## 2025-02-07 PROCEDURE — 36415 COLL VENOUS BLD VENIPUNCTURE: CPT

## 2025-02-07 PROCEDURE — 85025 COMPLETE CBC W/AUTO DIFF WBC: CPT

## 2025-02-07 PROCEDURE — 86480 TB TEST CELL IMMUN MEASURE: CPT

## 2025-02-07 NOTE — PROGRESS NOTES
"Chief Complaint  Crohn's Disease    Brea Fitzpatrick is a 45 y.o. female who presents today to [facility] for Crohn's Disease.    HPI:   44-year-old female with PMH of Crohn's disease previously followed by Jaxon Schuster PA-C,  and more recently, Dr. Dumont, see documentation for prior management.  CT enterography in July 2022 showing small bowel inflammation in the distal ileum.  She had a mildly elevated calprotectin.  Prior to CT enterography, she had a ED abdomen pelvis that documented circumferential inflammation of the distal ileum.  She does not have a biopsy confirming Crohn's disease.  Patient trialed Humira in the past but felt like this was causing peripheral neuropathy.  Her first Stelara infusion was 12/8/2022.  EGD/colonoscopy was performed in June 2022 by Dr. Dumont.  This was notable for normal esophagus, small hiatal hernia, patchy mild erythematous mucosa without bleeding in the gastric antrum, normal duodenum.  Colonoscopy was notable for 4 mm polyp in the cecum, 2 sessile polyps in the sigmoid colon, internal hemorrhoids, normal terminal ileum.  Gastric antrum biopsy was notable for mild chronic gastritis without intestinal metaplasia or dysplasia.  Esophagus biopsy notable for mild chronic esophagitis with minute fragments of benign glandular.  Cecum polyp was notable for benign colonic mucosa with no definitive histopathologic abnormalities.  Sigmoid  polyps were hyperplastic x 2.      9/24/2024: Today, patient reports that she is having issues with bloating after eating.  States that after each meal she typically has immediate bloating and then within 2-3 hours she has to run to the bathroom.  She states that when she has a bowel movement it is very \"forceful and pressurized diarrhea.\"  She does not have blood, melena, or mucus in her stools.  She states that the stool is typically yellow with \"little floating pieces.\" This is very painful for " her.  He states this has been ongoing for the last 2-3 weeks.  She reports having at least 1-2 bowel movements every day that she rates as BSS 7.  She has discontinued soda and carbonated beverages and started drinking more water.  However, this is not alleviated her symptoms.  She notes chronic nausea and 1 episode of vomiting.  She states that she has indigestion in her midesophagus.  She denies dysphagia.  However, she does have a full sensation in her midesophagus.     Of note, her last Stelara injection was on August 28 and her next Stelara injection is scheduled for October 28.  Lab work was obtained.  This was notable for an elevated ESR and CRP.  Fecal calprotectin was elevated as well.  Patient was subsequently placed on 1 month steroid taper per Dr. Dumont.  Unfortunately C. difficile of and GI panels were never collected.  Colonoscopy was performed on 10/29/2024 by Dr. Dumont.  This was notable for normal colon, internal hemorrhoids, terminal ileum was normal, mild scarring of the ileocecal valve.  Plan moving forward is to increase Stelara dosing to every 6 weeks instead of current every 8 weeks.     12/13/2024: Today, patient presents for follow-up.  She states that she has been doing better.  She is no longer having diarrhea, bloating, or abdominal pain.  She is still waiting for Stelara dose to be approved by insurance.  Otherwise, she states that she is doing well.  She notes having more frequent acid reflux.  Currently she is having acid reflux 3-4 times per week on Protonix 40 mg once per day.  On last visit patient was having issues with dysphagia, however this has resolved as well.  She has no other complaints today.  Patient's Protonix was increased to 40 mg twice daily.  Unfortunately, Stelara dosing every 6 weeks was not approved by insurance.  Patient was subsequently started on Cimzia 400 mg per Dr. Dumont.               Interval History:    Today, patient presents for follow-up.  She  notes that her GERD has been better.  She is still having excessive belching.  States that she has stopped drinking Mountain Dew and now only drinks water and Sprite.  For the last few weeks, patient has had diarrhea.  She has not had a few episodes of bloody diarrhea as well.  She denies abdominal pain.  Patient had Stelara injection most recently on 12/9/2024.  Unfortunately, insurance would not approve for this to be administered every 6 weeks.  Therefore, Dr. Dumont recommended switch to Cimzia.  Patient has had a change in insurance plan and this will no longer be covered by [facility] specialty pharmacy.  Initial Cimzia dose will be administered today in office.  Subsequent doses will be administered at home.    Of note, patient was monitored in office x 30 minutes after injection.  Vitals were taken once again.  VSS.  Patient was reevaluated after 30-min.  Patient reported that she was doing fine.            Previous History:   Past Medical History:   Diagnosis Date    Anemia     Colon polyp     Crohn's disease 2022    Depression     Fibromyalgia     Fibromyalgia, primary     GERD (gastroesophageal reflux disease)     Hernia June 2022    IBS (irritable bowel syndrome)       Past Surgical History:   Procedure Laterality Date    APPENDECTOMY      COLONOSCOPY      COLONOSCOPY N/A 06/07/2022    Procedure: COLONOSCOPY FOR SCREENING;  Surgeon: Jo Lamb MD;  Location: [facility] OR;  Service: Gastroenterology;  Laterality: N/A;    COLONOSCOPY N/A 10/29/2024    Procedure: COLONOSCOPY;  Surgeon: Jo Lamb MD;  Location: [facility] OR;  Service: Gastroenterology;  Laterality: N/A;    ENDOSCOPY N/A 06/07/2022    Procedure: ESOPHAGOGASTRODUODENOSCOPY WITH BIOPSY;  Surgeon: Jo Lamb MD;  Location: [facility];  Service: Gastroenterology;  Laterality: N/A;    ROTATOR CUFF REPAIR Right     TUBAL ABDOMINAL LIGATION      UPPER GASTROINTESTINAL ENDOSCOPY        Social History  "    Socioeconomic History    Marital status:    Tobacco Use    Smoking status: Former     Current packs/day: 0.00     Types: Cigarettes     Quit date: 2001     Years since quittin.1    Smokeless tobacco: Never   Vaping Use    Vaping status: Every Day    Substances: Nicotine, Flavoring   Substance and Sexual Activity    Alcohol use: Never    Drug use: Never    Sexual activity: Yes     Partners: Male     Birth control/protection: Tubal ligation, Surgical     Comment: Tubal        Current Medications:  Current Outpatient Medications   Medication Sig Dispense Refill    [START ON 2025] Certolizumab Pegol (Cimzia, 2 Syringe,) 200 MG/ML Prefilled Syringe Kit injection Inject 2 mL under the skin into the appropriate area as directed Every 28 (Twenty-Eight) Days. 2 mL 5    pantoprazole (Protonix) 40 MG EC tablet Take 1 tablet by mouth 2 (Two) Times a Day Before Meals. 60 tablet 5    tiZANidine (ZANAFLEX) 4 MG tablet Every 8 (Eight) Hours.       No current facility-administered medications for this visit.       Allergies:   No Known Allergies    Vitals:   /88   Pulse 83   Ht 170.2 cm (67\")   Wt 88.7 kg (195 lb 9.6 oz)   BMI 30.64 kg/m²   Estimated body mass index is 30.64 kg/m² as calculated from the following:    Height as of this encounter: 170.2 cm (67\").    Weight as of this encounter: 88.7 kg (195 lb 9.6 oz).    ROS:   Review of Systems   Constitutional: Negative.    HENT: Negative.     Gastrointestinal:  Positive for abdominal pain and blood in stool. Negative for abdominal distention, anal bleeding, constipation, diarrhea, nausea, rectal pain and vomiting.   All other systems reviewed and are negative.       Physical Exam:   Physical Exam  Vitals reviewed.   Constitutional:       General: She is not in acute distress.     Appearance: She is well-groomed. She is not toxic-appearing.   HENT:      Head: Normocephalic and atraumatic.      Mouth/Throat:      Mouth: Mucous membranes are moist. "   Eyes:      Extraocular Movements: Extraocular movements intact.   Cardiovascular:      Rate and Rhythm: Normal rate and regular rhythm.      Heart sounds: Normal heart sounds. No murmur heard.  Pulmonary:      Effort: Pulmonary effort is normal. No respiratory distress.      Breath sounds: Normal breath sounds. No stridor. No wheezing or rales.   Abdominal:      General: Bowel sounds are normal. There is no distension.      Palpations: Abdomen is soft. There is no mass.      Tenderness: There is no abdominal tenderness. There is no guarding or rebound.      Hernia: No hernia is present.   Skin:     General: Skin is warm.      Coloration: Skin is not jaundiced.      Findings: No rash.   Neurological:      Mental Status: She is alert and oriented to person, place, and time.   Psychiatric:         Mood and Affect: Mood and affect normal.         Speech: Speech normal.         Behavior: Behavior normal. Behavior is cooperative.         Thought Content: Thought content normal.          Lab Results:   Lab Results   Component Value Date    WBC 6.61 02/07/2025    HGB 12.4 02/07/2025    HCT 37.6 02/07/2025    MCV 91.7 02/07/2025    RDW 12.2 (L) 02/07/2025     02/07/2025    NEUTRORELPCT 69.1 02/07/2025    LYMPHORELPCT 23.6 02/07/2025    MONORELPCT 5.4 02/07/2025    EOSRELPCT 0.8 02/07/2025    BASORELPCT 0.3 02/07/2025    NEUTROABS 4.57 02/07/2025    LYMPHSABS 1.56 02/07/2025       Lab Results   Component Value Date     02/07/2025    K 3.7 02/07/2025    CO2 26.0 02/07/2025     02/07/2025    BUN 10 02/07/2025    CREATININE 0.76 02/07/2025    GLUCOSE 94 02/07/2025    CALCIUM 9.2 02/07/2025    ALKPHOS 86 02/07/2025    AST 11 02/07/2025    ALT 8 02/07/2025    BILITOT <0.2 02/07/2025    ALBUMIN 3.5 02/07/2025    PROTEINTOT 6.6 02/07/2025       Pathology:        Endoscopy:        Imaging:       Results review: During today's encounter, all relevant clinical data has been reviewed.      Assessment and Plan    1.  Crohn's disease of small intestine with complication (Primary)  We will obtain the following labs today.  -     Cancel: QuantiFERON TB Gold; Future  -     CBC & Differential  -     Comprehensive Metabolic Panel; Future  Initial Cimzia dose administered in office.  Patient was monitored for 30 minutes.  She tolerated this well.  Subsequent doses will be administered at home as ordered.      New Medications:   No orders of the defined types were placed in this encounter.      Discontinued Medications:   There are no discontinued medications.     Visit Diagnoses:    ICD-10-CM ICD-9-CM   1. Crohn's disease of small intestine with complication  K50.019 555.0            Follow Up:   Return in about 2 months (around 4/7/2025).    The patient was in agreement with the plan and all questions were answered to patient's satisfaction.        This document has been electronically signed by Nettie Petersen PA-C   February 10, 2025 16:49 EST    Dictated Utilizing Dragon Dictation: Part of this note may be an electronic transcription/translation of spoken language to printed text using the Dragon Dictation System.    CC:  No ref. provider found  Kym Valentine APRN

## 2025-02-10 ENCOUNTER — TELEPHONE (OUTPATIENT)
Dept: GASTROENTEROLOGY | Facility: CLINIC | Age: 45
End: 2025-02-10
Payer: COMMERCIAL

## 2025-02-10 LAB
GAMMA INTERFERON BACKGROUND BLD IA-ACNC: 0.02 IU/ML
M TB IFN-G BLD-IMP: NEGATIVE
M TB IFN-G CD4+ BCKGRND COR BLD-ACNC: 0.05 IU/ML
M TB IFN-G CD4+CD8+ BCKGRND COR BLD-ACNC: 0.03 IU/ML
MITOGEN IGNF BCKGRD COR BLD-ACNC: 7.38 IU/ML
QUANTIFERON INCUBATION: NORMAL
SERVICE CMNT-IMP: NORMAL

## 2025-02-10 NOTE — TELEPHONE ENCOUNTER
----- Message from Nettie Petersen sent at 2/10/2025  8:23 AM EST -----  Please let patient know that CBC and CMP are within normal limits.

## 2025-02-10 NOTE — TELEPHONE ENCOUNTER
----- Message from Nettie Petersen sent at 2/10/2025  1:22 PM EST -----  Please let patient know that TB test is negative.

## 2025-05-05 ENCOUNTER — TELEPHONE (OUTPATIENT)
Dept: GASTROENTEROLOGY | Facility: CLINIC | Age: 45
End: 2025-05-05
Payer: COMMERCIAL

## 2025-05-05 DIAGNOSIS — K50.019 CROHN'S DISEASE OF SMALL INTESTINE WITH COMPLICATION: Primary | ICD-10-CM

## 2025-05-05 RX ORDER — CERTOLIZUMAB PEGOL 200 MG/ML
400 INJECTION, SOLUTION SUBCUTANEOUS
Qty: 7 ML | Refills: 3 | Status: SHIPPED | OUTPATIENT
Start: 2025-05-05

## 2025-05-05 RX ORDER — CERTOLIZUMAB PEGOL 200 MG/ML
400 INJECTION, SOLUTION SUBCUTANEOUS
Qty: 7 ML | Refills: 3 | Status: SHIPPED | OUTPATIENT
Start: 2025-05-05 | End: 2025-05-05

## 2025-05-22 ENCOUNTER — OFFICE VISIT (OUTPATIENT)
Dept: GASTROENTEROLOGY | Facility: CLINIC | Age: 45
End: 2025-05-22
Payer: COMMERCIAL

## 2025-05-22 VITALS
DIASTOLIC BLOOD PRESSURE: 82 MMHG | HEIGHT: 67 IN | HEART RATE: 61 BPM | SYSTOLIC BLOOD PRESSURE: 134 MMHG | WEIGHT: 190.2 LBS | BODY MASS INDEX: 29.85 KG/M2

## 2025-05-22 DIAGNOSIS — R19.7 DIARRHEA, UNSPECIFIED TYPE: ICD-10-CM

## 2025-05-22 DIAGNOSIS — K50.019 CROHN'S DISEASE OF SMALL INTESTINE WITH COMPLICATION: ICD-10-CM

## 2025-05-22 DIAGNOSIS — K21.9 GASTROESOPHAGEAL REFLUX DISEASE WITHOUT ESOPHAGITIS: Primary | ICD-10-CM

## 2025-05-22 DIAGNOSIS — R10.11 RUQ PAIN: ICD-10-CM

## 2025-05-22 PROCEDURE — 99214 OFFICE O/P EST MOD 30 MIN: CPT

## 2025-05-22 RX ORDER — LANSOPRAZOLE 30 MG/1
30 CAPSULE, DELAYED RELEASE ORAL 2 TIMES DAILY
Qty: 60 CAPSULE | Refills: 5 | Status: SHIPPED | OUTPATIENT
Start: 2025-05-22

## 2025-05-22 RX ORDER — METOPROLOL TARTRATE 50 MG
50 TABLET ORAL 2 TIMES DAILY
COMMUNITY

## 2025-05-22 NOTE — PROGRESS NOTES
"Chief Complaint  Crohn's Disease    Brea Fitzpatrick is a 45 y.o. female who presents today to [facility] for Crohn's Disease.    HPI:   44-year-old female with PMH of Crohn's disease previously followed by Jaxon Schuster PA-C,  and more recently, Dr. Dumont, see documentation for prior management.  CT enterography in July 2022 showing small bowel inflammation in the distal ileum.  She had a mildly elevated calprotectin.  Prior to CT enterography, she had a ED abdomen pelvis that documented circumferential inflammation of the distal ileum.  She does not have a biopsy confirming Crohn's disease.  Patient trialed Humira in the past but felt like this was causing peripheral neuropathy.  Her first Stelara infusion was 12/8/2022.  EGD/colonoscopy was performed in June 2022 by Dr. Dumont.  This was notable for normal esophagus, small hiatal hernia, patchy mild erythematous mucosa without bleeding in the gastric antrum, normal duodenum.  Colonoscopy was notable for 4 mm polyp in the cecum, 2 sessile polyps in the sigmoid colon, internal hemorrhoids, normal terminal ileum.  Gastric antrum biopsy was notable for mild chronic gastritis without intestinal metaplasia or dysplasia.  Esophagus biopsy notable for mild chronic esophagitis with minute fragments of benign glandular.  Cecum polyp was notable for benign colonic mucosa with no definitive histopathologic abnormalities.  Sigmoid  polyps were hyperplastic x 2.      9/24/2024: Today, patient reports that she is having issues with bloating after eating.  States that after each meal she typically has immediate bloating and then within 2-3 hours she has to run to the bathroom.  She states that when she has a bowel movement it is very \"forceful and pressurized diarrhea.\"  She does not have blood, melena, or mucus in her stools.  She states that the stool is typically yellow with \"little floating pieces.\" This is very painful for " her.  He states this has been ongoing for the last 2-3 weeks.  She reports having at least 1-2 bowel movements every day that she rates as BSS 7.  She has discontinued soda and carbonated beverages and started drinking more water.  However, this is not alleviated her symptoms.  She notes chronic nausea and 1 episode of vomiting.  She states that she has indigestion in her midesophagus.  She denies dysphagia.  However, she does have a full sensation in her midesophagus.     Of note, her last Stelara injection was on August 28 and her next Stelara injection is scheduled for October 28.  Lab work was obtained.  This was notable for an elevated ESR and CRP.  Fecal calprotectin was elevated as well.  Patient was subsequently placed on 1 month steroid taper per Dr. Dumont.  Unfortunately C. difficile of and GI panels were never collected.  Colonoscopy was performed on 10/29/2024 by Dr. Dumont.  This was notable for normal colon, internal hemorrhoids, terminal ileum was normal, mild scarring of the ileocecal valve.  Plan moving forward is to increase Stelara dosing to every 6 weeks instead of current every 8 weeks.     12/13/2024: Today, patient presents for follow-up.  She states that she has been doing better.  She is no longer having diarrhea, bloating, or abdominal pain.  She is still waiting for Stelara dose to be approved by insurance.  Otherwise, she states that she is doing well.  She notes having more frequent acid reflux.  Currently she is having acid reflux 3-4 times per week on Protonix 40 mg once per day.  On last visit patient was having issues with dysphagia, however this has resolved as well.  She has no other complaints today.  Patient's Protonix was increased to 40 mg twice daily.  Unfortunately, Stelara dosing every 6 weeks was not approved by insurance.  Patient was subsequently started on Cimzia 400 mg per Dr. Dumont.        2/7/2025: Patient presents for follow-up.  She notes that her GERD has been  better.  She is still having excessive belching.  States that she has stopped drinking Mountain Dew and now only drinks water and Sprite.  For the last few weeks, patient has had diarrhea.  She has not had but a few episodes of bloody diarrhea as well.  She denies abdominal pain.  Patient had Stelara injection most recently on 12/9/2024.  Unfortunately, insurance would not approve for this to be administered every 6 weeks.  Therefore, Dr. Dumont recommended switch to Cimzia.  Patient has had a change in insurance plan and this will no longer be covered by [facility] specialty pharmacy.  Initial Cimzia dose will be administered today in office.  Subsequent doses will be administered at home.     Of note, patient was monitored in office x 30 minutes after injection.  Vitals were taken once again.  VSS.  Patient was reevaluated after 30-min.  Patient reported that she was doing fine.             Interval History:    Today, patient presents for follow-up.  Patient has been on Cimzia since February.  She notes having ongoing diarrhea.  On good days she has 1-2 bowel movements that she rates as BSS 7.  However, on bad days patient often has 5 bowel movements that she rates as BSS 7.  Bad days-at least 2 days each week.  She has not noticed any bloody or black stools.  However, patient does have lower abdominal tenderness for the last several weeks.  Patient has noticed her GERD is poorly controlled on Protonix 40 mg twice daily.  Along with GERD, patient does note some mild nausea.  She has been having right upper quadrant pain that radiates to her back that typically is worse after eating.  No alleviating factors identified.  Patient had a gallbladder ultrasound performed on/20 1/2025 notable for no gallstones, common bile duct is prominent at 7 mm, correlate with CT abdomen with contrast to further evaluate.  CT abdomen with contrast was performed on 5/19/2025.  No acute intra-abdominal findings per CT.    Previous  History:   Past Medical History:   Diagnosis Date    Anemia     Colon polyp     Crohn's disease     Depression     Fibromyalgia     Fibromyalgia, primary     GERD (gastroesophageal reflux disease)     Hernia 2022    IBS (irritable bowel syndrome)       Past Surgical History:   Procedure Laterality Date    APPENDECTOMY      COLONOSCOPY      COLONOSCOPY N/A 2022    Procedure: COLONOSCOPY FOR SCREENING;  Surgeon: Jo Lamb MD;  Location: [facility];  Service: Gastroenterology;  Laterality: N/A;    COLONOSCOPY N/A 10/29/2024    Procedure: COLONOSCOPY;  Surgeon: Jo Lamb MD;  Location: [facility] OR;  Service: Gastroenterology;  Laterality: N/A;    ENDOSCOPY N/A 2022    Procedure: ESOPHAGOGASTRODUODENOSCOPY WITH BIOPSY;  Surgeon: Jo Lamb MD;  Location: [facility] OR;  Service: Gastroenterology;  Laterality: N/A;    ROTATOR CUFF REPAIR Right     TUBAL ABDOMINAL LIGATION      UPPER GASTROINTESTINAL ENDOSCOPY        Social History     Socioeconomic History    Marital status:    Tobacco Use    Smoking status: Former     Current packs/day: 0.00     Types: Cigarettes     Quit date: 2001     Years since quittin.4    Smokeless tobacco: Never   Vaping Use    Vaping status: Every Day    Substances: Nicotine, Flavoring   Substance and Sexual Activity    Alcohol use: Never    Drug use: Never    Sexual activity: Yes     Partners: Male     Birth control/protection: Tubal ligation, Surgical     Comment: Tubal        Current Medications:  Current Outpatient Medications   Medication Sig Dispense Refill    Certolizumab Pegol (Cimzia, 2 Syringe,) 200 MG/ML Prefilled Syringe Kit injection Inject 2 mL under the skin into the appropriate area as directed Every 28 (Twenty-Eight) Days. 7 mL 3    metoprolol tartrate (LOPRESSOR) 50 MG tablet Take 1 tablet by mouth 2 (Two) Times a Day.      tiZANidine (ZANAFLEX) 4 MG tablet Every 8 (Eight) Hours.      lansoprazole  "(PREVACID) 30 MG capsule Take 1 capsule by mouth 2 (Two) Times a Day. 60 capsule 5     No current facility-administered medications for this visit.       Allergies:   No Known Allergies    Vitals:   /82   Pulse 61   Ht 170.2 cm (67\")   Wt 86.3 kg (190 lb 3.2 oz)   BMI 29.79 kg/m²   Estimated body mass index is 29.79 kg/m² as calculated from the following:    Height as of this encounter: 170.2 cm (67\").    Weight as of this encounter: 86.3 kg (190 lb 3.2 oz).    ROS:   Review of Systems   Constitutional: Negative.    HENT: Negative.     Respiratory: Negative.     Cardiovascular: Negative.    Gastrointestinal:  Positive for abdominal pain, diarrhea and nausea. Negative for abdominal distention, anal bleeding, blood in stool, constipation, rectal pain and vomiting.   All other systems reviewed and are negative.       Physical Exam:   Physical Exam  Vitals reviewed.   Constitutional:       General: She is not in acute distress.     Appearance: Normal appearance. She is well-groomed. She is not toxic-appearing.   HENT:      Head: Normocephalic and atraumatic.      Mouth/Throat:      Mouth: Mucous membranes are moist.   Eyes:      Extraocular Movements: Extraocular movements intact.   Cardiovascular:      Rate and Rhythm: Normal rate and regular rhythm.      Heart sounds: Normal heart sounds. No murmur heard.  Pulmonary:      Effort: Pulmonary effort is normal. No respiratory distress.      Breath sounds: Normal breath sounds. No stridor. No wheezing or rales.   Abdominal:      General: Bowel sounds are normal. There is no distension.      Palpations: Abdomen is soft. There is no mass.      Tenderness: There is abdominal tenderness (mild). There is no guarding or rebound.      Hernia: No hernia is present.   Skin:     General: Skin is warm.      Coloration: Skin is not jaundiced.      Findings: No rash.   Neurological:      Mental Status: She is alert and oriented to person, place, and time.   Psychiatric:       "   Mood and Affect: Mood and affect normal.         Speech: Speech normal.         Behavior: Behavior normal. Behavior is cooperative.         Thought Content: Thought content normal.          Lab Results:   Lab Results   Component Value Date    WBC 6.61 02/07/2025    HGB 12.4 02/07/2025    HCT 37.6 02/07/2025    MCV 91.7 02/07/2025    RDW 12.2 (L) 02/07/2025     02/07/2025    NEUTRORELPCT 69.1 02/07/2025    LYMPHORELPCT 23.6 02/07/2025    MONORELPCT 5.4 02/07/2025    EOSRELPCT 0.8 02/07/2025    BASORELPCT 0.3 02/07/2025    NEUTROABS 4.57 02/07/2025    LYMPHSABS 1.56 02/07/2025       Lab Results   Component Value Date     02/07/2025    K 3.7 02/07/2025    CO2 26.0 02/07/2025     02/07/2025    BUN 10 02/07/2025    CREATININE 0.76 02/07/2025    GLUCOSE 94 02/07/2025    CALCIUM 9.2 02/07/2025    ALKPHOS 86 02/07/2025    AST 11 02/07/2025    ALT 8 02/07/2025    BILITOT <0.2 02/07/2025    ALBUMIN 3.5 02/07/2025    PROTEINTOT 6.6 02/07/2025       Pathology:        Endoscopy:        Imaging:   No Images in the past 120 days found..        Results review: During today's encounter, all relevant clinical data has been reviewed.      Assessment and Plan  1. Gastroesophageal reflux disease without esophagitis (Primary)  Discussed management for GERD: encouraged weight loss, HOB elevation at night, avoidance of meals 2-3 hours before bedtime, avoid trigger foods (caffeine, alcohol, chocolate, acidic/spicy foods e.g oranges/tomatoes), and encouraged smoking cessation  Given that this is poorly controlled on Protonix 40 mg twice daily, will discontinue at this time.  Will initiate Prevacid 30 mg twice daily.  -     lansoprazole (PREVACID) 30 MG capsule; Take 1 capsule by mouth 2 (Two) Times a Day.  Dispense: 60 capsule; Refill: 5    2. RUQ pain  Obtain HIDA scan for further evaluation of biliary dyskinesia.  -     NM HIDA Scan With Pharmacological Intervention; Future    3. Crohn's disease of small intestine with  complication  4. Diarrhea, unspecified type  Patient reports frequent diarrhea despite being on Cimzia x 3 months.  Did discuss with Dr. Dumont who recommended initiating Tremfya.  Order has been faxed to [facility] specialty pharmacy.  Patient was amenable to proceeding.      New Medications:   New Medications Ordered This Visit   Medications    lansoprazole (PREVACID) 30 MG capsule     Sig: Take 1 capsule by mouth 2 (Two) Times a Day.     Dispense:  60 capsule     Refill:  5       Discontinued Medications:   Medications Discontinued During This Encounter   Medication Reason    pantoprazole (Protonix) 40 MG EC tablet Not Efficacious        Visit Diagnoses:    ICD-10-CM ICD-9-CM   1. Gastroesophageal reflux disease without esophagitis  K21.9 530.81   2. RUQ pain  R10.11 789.01   3. Crohn's disease of small intestine with complication  K50.019 555.0   4. Diarrhea, unspecified type  R19.7 787.91            Follow Up:   Return in about 3 months (around 8/22/2025).    The patient was in agreement with the plan and all questions were answered to patient's satisfaction.        This document has been electronically signed by Nettie Lilly PA-C   May 22, 2025 12:32 EDT    Dictated Utilizing Dragon Dictation: Part of this note may be an electronic transcription/translation of spoken language to printed text using the Dragon Dictation System.    CC:  No ref. provider found  Kym Valentine APRN

## 2025-05-30 ENCOUNTER — TELEPHONE (OUTPATIENT)
Dept: GASTROENTEROLOGY | Facility: CLINIC | Age: 45
End: 2025-05-30

## 2025-05-30 NOTE — TELEPHONE ENCOUNTER
"         Hub staff attempted to follow warm transfer process and was unsuccessful     Caller: Brea Fitzpatrick \"Halima\"    Relationship to patient: Self    Best call back number:  [phone]    Patient is needing: PT CALLED STATING THAT SHE CALLED [facility] AND THEY DID NOT HAVE AN ORDER FOR NEW MEDICATION AND INFUSION PLAN. PLEASE CALL AND ADVISE. TREMFYA IS THE NEW MEDICATION.            "

## 2025-05-30 NOTE — TELEPHONE ENCOUNTER
I faxed this on May 23rd and it looks like it is in her chart. I am not familiar with how this medication works.

## 2025-06-03 DIAGNOSIS — K50.019 CROHN'S DISEASE OF SMALL INTESTINE WITH COMPLICATION: Primary | ICD-10-CM

## 2025-06-03 RX ORDER — FAMOTIDINE 10 MG/ML
20 INJECTION, SOLUTION INTRAVENOUS AS NEEDED
OUTPATIENT
Start: 2025-06-03

## 2025-06-03 RX ORDER — HYDROCORTISONE SODIUM SUCCINATE 100 MG/2ML
100 INJECTION INTRAMUSCULAR; INTRAVENOUS AS NEEDED
OUTPATIENT
Start: 2025-06-03

## 2025-06-03 RX ORDER — DIPHENHYDRAMINE HYDROCHLORIDE 50 MG/ML
50 INJECTION, SOLUTION INTRAMUSCULAR; INTRAVENOUS AS NEEDED
OUTPATIENT
Start: 2025-06-03

## 2025-06-03 NOTE — TELEPHONE ENCOUNTER
Spoke with patient. She stated that she was going to call infusion clinic to see how this needs to be set up.

## 2025-06-06 ENCOUNTER — HOSPITAL ENCOUNTER (OUTPATIENT)
Dept: NUCLEAR MEDICINE | Facility: HOSPITAL | Age: 45
Discharge: HOME | End: 2025-06-06
Payer: COMMERCIAL

## 2025-06-06 DIAGNOSIS — R10.11 RUQ PAIN: ICD-10-CM

## 2025-06-06 PROCEDURE — 34310000005 TECHNETIUM TC 99M MEBROFENIN KIT

## 2025-06-06 PROCEDURE — A9537 TC99M MEBROFENIN: HCPCS

## 2025-06-06 PROCEDURE — 25010000002 SINCALIDE PER 5 MCG

## 2025-06-06 PROCEDURE — 78227 HEPATOBIL SYST IMAGE W/DRUG: CPT

## 2025-06-06 RX ORDER — SINCALIDE 5 UG/5ML
0.04 INJECTION, POWDER, LYOPHILIZED, FOR SOLUTION INTRAVENOUS
Status: COMPLETED | OUTPATIENT
Start: 2025-06-06 | End: 2025-06-06

## 2025-06-06 RX ORDER — KIT FOR THE PREPARATION OF TECHNETIUM TC 99M MEBROFENIN 45 MG/10ML
1 INJECTION, POWDER, LYOPHILIZED, FOR SOLUTION INTRAVENOUS
Status: COMPLETED | OUTPATIENT
Start: 2025-06-06 | End: 2025-06-06

## 2025-06-06 RX ADMIN — MEBROFENIN 1 DOSE: 45 INJECTION, POWDER, LYOPHILIZED, FOR SOLUTION INTRAVENOUS at 11:05

## 2025-06-06 RX ADMIN — SINCALIDE 3.5 MCG: 5 INJECTION, POWDER, LYOPHILIZED, FOR SOLUTION INTRAVENOUS at 11:52

## 2025-06-10 ENCOUNTER — RESULTS FOLLOW-UP (OUTPATIENT)
Dept: GASTROENTEROLOGY | Facility: CLINIC | Age: 45
End: 2025-06-10
Payer: COMMERCIAL

## 2025-06-10 DIAGNOSIS — K82.8 BILIARY DYSKINESIA: Primary | ICD-10-CM

## 2025-06-10 NOTE — TELEPHONE ENCOUNTER
Patient to discuss HIDA scan results with her.  Discussed with patient that EF on HIDA scan was 1%.  This is indicative of biliary dyskinesia, did recommend referral to general surgery for evaluation of elective CCY. Patient was amenable to referral. Referral placed.

## 2025-06-17 ENCOUNTER — INFUSION (OUTPATIENT)
Dept: ONCOLOGY | Facility: HOSPITAL | Age: 45
End: 2025-06-17
Payer: COMMERCIAL

## 2025-06-17 VITALS
BODY MASS INDEX: 28.76 KG/M2 | DIASTOLIC BLOOD PRESSURE: 79 MMHG | OXYGEN SATURATION: 100 % | TEMPERATURE: 97.7 F | RESPIRATION RATE: 18 BRPM | SYSTOLIC BLOOD PRESSURE: 127 MMHG | WEIGHT: 183.6 LBS | HEART RATE: 64 BPM

## 2025-06-17 DIAGNOSIS — K50.019 CROHN'S DISEASE OF SMALL INTESTINE WITH COMPLICATION: Primary | ICD-10-CM

## 2025-06-17 PROCEDURE — 25010000002 GUSELKUMAB 200 MG/20ML SOLUTION 20 ML VIAL

## 2025-06-17 PROCEDURE — 25810000003 SODIUM CHLORIDE 0.9 % SOLUTION 250 ML FLEX CONT

## 2025-06-17 PROCEDURE — 96365 THER/PROPH/DIAG IV INF INIT: CPT

## 2025-06-17 RX ORDER — FAMOTIDINE 10 MG/ML
20 INJECTION, SOLUTION INTRAVENOUS AS NEEDED
OUTPATIENT
Start: 2025-07-15

## 2025-06-17 RX ORDER — HYDROCORTISONE SODIUM SUCCINATE 100 MG/2ML
100 INJECTION INTRAMUSCULAR; INTRAVENOUS AS NEEDED
OUTPATIENT
Start: 2025-07-15

## 2025-06-17 RX ORDER — DIPHENHYDRAMINE HYDROCHLORIDE 50 MG/ML
50 INJECTION, SOLUTION INTRAMUSCULAR; INTRAVENOUS AS NEEDED
OUTPATIENT
Start: 2025-07-15

## 2025-06-17 RX ADMIN — GUSELKUMAB 200 MG: 200 INJECTION INTRAVENOUS at 13:56

## 2025-07-15 ENCOUNTER — OFFICE VISIT (OUTPATIENT)
Dept: SURGERY | Facility: CLINIC | Age: 45
End: 2025-07-15
Payer: COMMERCIAL

## 2025-07-15 ENCOUNTER — INFUSION (OUTPATIENT)
Dept: ONCOLOGY | Facility: HOSPITAL | Age: 45
End: 2025-07-15
Payer: COMMERCIAL

## 2025-07-15 VITALS — WEIGHT: 185 LBS | HEIGHT: 67 IN | BODY MASS INDEX: 29.03 KG/M2

## 2025-07-15 VITALS
DIASTOLIC BLOOD PRESSURE: 76 MMHG | TEMPERATURE: 97.7 F | OXYGEN SATURATION: 97 % | SYSTOLIC BLOOD PRESSURE: 128 MMHG | RESPIRATION RATE: 18 BRPM | HEART RATE: 66 BPM

## 2025-07-15 DIAGNOSIS — K82.8 BILIARY DYSKINESIA: Primary | ICD-10-CM

## 2025-07-15 DIAGNOSIS — K50.019 CROHN'S DISEASE OF SMALL INTESTINE WITH COMPLICATION: Primary | ICD-10-CM

## 2025-07-15 PROCEDURE — 25010000002 GUSELKUMAB 200 MG/20ML SOLUTION 20 ML VIAL

## 2025-07-15 PROCEDURE — 25810000003 SODIUM CHLORIDE 0.9 % SOLUTION 250 ML FLEX CONT

## 2025-07-15 PROCEDURE — 96365 THER/PROPH/DIAG IV INF INIT: CPT

## 2025-07-15 RX ORDER — HYDROCORTISONE SODIUM SUCCINATE 100 MG/2ML
100 INJECTION INTRAMUSCULAR; INTRAVENOUS AS NEEDED
OUTPATIENT
Start: 2025-08-12

## 2025-07-15 RX ORDER — ACETAMINOPHEN 325 MG/1
650 TABLET ORAL ONCE
OUTPATIENT
Start: 2025-07-15 | End: 2025-07-15

## 2025-07-15 RX ORDER — SODIUM CHLORIDE 0.9 % (FLUSH) 0.9 %
10 SYRINGE (ML) INJECTION AS NEEDED
OUTPATIENT
Start: 2025-07-15

## 2025-07-15 RX ORDER — DIPHENHYDRAMINE HYDROCHLORIDE 50 MG/ML
50 INJECTION, SOLUTION INTRAMUSCULAR; INTRAVENOUS AS NEEDED
OUTPATIENT
Start: 2025-08-12

## 2025-07-15 RX ORDER — FAMOTIDINE 10 MG/ML
20 INJECTION, SOLUTION INTRAVENOUS AS NEEDED
OUTPATIENT
Start: 2025-08-12

## 2025-07-15 RX ORDER — SODIUM CHLORIDE 9 MG/ML
40 INJECTION, SOLUTION INTRAVENOUS AS NEEDED
OUTPATIENT
Start: 2025-07-15

## 2025-07-15 RX ORDER — INDOCYANINE GREEN AND WATER 25 MG
2.5 KIT INJECTION ONCE
OUTPATIENT
Start: 2025-07-15 | End: 2025-07-15

## 2025-07-15 RX ORDER — SODIUM CHLORIDE 0.9 % (FLUSH) 0.9 %
3 SYRINGE (ML) INJECTION EVERY 12 HOURS SCHEDULED
OUTPATIENT
Start: 2025-07-15

## 2025-07-15 RX ADMIN — GUSELKUMAB 200 MG: 200 INJECTION INTRAVENOUS at 13:31

## 2025-07-15 NOTE — PROGRESS NOTES
Subjective   Brea Fitzpatrick is a 45 y.o. female is being seen for consultation today at the request of Jesus Roque APRN    Brea Fitzpatrick is a 45 y.o. female with history of small bowel Crohn's disease that has not required surgery.  She is on a new infusion and has 2 infusions scheduled before she starts home dosing with once a month subcutaneous injections.  Patient has HIDA scan showing ejection fraction 1% has consistent right upper quadrant pain after fatty greasy meals.  No previous abdominal surgeries in the upper abdomen reported.  No jaundice or scleral icterus noted.    History of Present Illness      Past Medical History:   Diagnosis Date    Anemia     Colon polyp     Crohn's disease     Depression     Fibromyalgia     Fibromyalgia, primary     GERD (gastroesophageal reflux disease)     Hernia 2022    Hypertension 2025    Put on  Metoprolol ER 50 mg and has been maintained since.    IBS (irritable bowel syndrome)        Family History   Problem Relation Age of Onset    Crohn's disease Father     Heart disease Father     Depression Mother     Hypertension Maternal Grandmother        Social History     Socioeconomic History    Marital status:    Tobacco Use    Smoking status: Former     Current packs/day: 0.00     Types: Cigarettes     Quit date: 2001     Years since quittin.5    Smokeless tobacco: Never   Vaping Use    Vaping status: Every Day    Substances: Nicotine, Flavoring   Substance and Sexual Activity    Alcohol use: Never    Drug use: Never    Sexual activity: Yes     Partners: Male     Birth control/protection: Tubal ligation, Surgical     Comment: Tubal       Past Surgical History:   Procedure Laterality Date    APPENDECTOMY      COLONOSCOPY      COLONOSCOPY N/A 2022    Procedure: COLONOSCOPY FOR SCREENING;  Surgeon: Jo Lamb MD;  Location: [facility];  Service: Gastroenterology;  Laterality: N/A;    COLONOSCOPY N/A 10/29/2024     "Procedure: COLONOSCOPY;  Surgeon: Jo Lamb MD;  Location:  [facility] OR;  Service: Gastroenterology;  Laterality: N/A;    ENDOSCOPY N/A 06/07/2022    Procedure: ESOPHAGOGASTRODUODENOSCOPY WITH BIOPSY;  Surgeon: Jo Lamb MD;  Location: [facility] OR;  Service: Gastroenterology;  Laterality: N/A;    ROTATOR CUFF REPAIR Right     TUBAL ABDOMINAL LIGATION      UPPER GASTROINTESTINAL ENDOSCOPY         Review of Systems   Constitutional:  Negative for activity change, appetite change, chills and fever.   HENT:  Negative for sore throat and trouble swallowing.    Eyes:  Negative for visual disturbance.   Respiratory:  Negative for cough and shortness of breath.    Cardiovascular:  Negative for chest pain and palpitations.   Gastrointestinal:  Negative for abdominal distention, abdominal pain, blood in stool, constipation, diarrhea, nausea and vomiting.   Endocrine: Negative for cold intolerance and heat intolerance.   Genitourinary:  Negative for dysuria.   Musculoskeletal:  Negative for joint swelling.   Skin:  Negative for color change, rash and wound.   Allergic/Immunologic: Negative for immunocompromised state.   Neurological:  Negative for dizziness, seizures, weakness and headaches.   Hematological:  Negative for adenopathy. Does not bruise/bleed easily.   Psychiatric/Behavioral:  Negative for agitation and confusion.        Results        Ht 170.2 cm (67\")   Wt 83.9 kg (185 lb)   BMI 28.98 kg/m²   Objective   Physical Exam  Constitutional:       Appearance: She is well-developed.   HENT:      Head: Normocephalic and atraumatic.   Eyes:      Conjunctiva/sclera: Conjunctivae normal.      Pupils: Pupils are equal, round, and reactive to light.   Neck:      Thyroid: No thyromegaly.      Vascular: No JVD.      Trachea: No tracheal deviation.   Cardiovascular:      Rate and Rhythm: Normal rate and regular rhythm.      Heart sounds: No murmur heard.     No friction rub. No gallop.   Pulmonary: "      Effort: Pulmonary effort is normal.      Breath sounds: Normal breath sounds.   Abdominal:      General: There is no distension.      Palpations: Abdomen is soft. There is no hepatomegaly or splenomegaly.      Tenderness: There is no abdominal tenderness.      Hernia: No hernia is present.   Musculoskeletal:         General: No deformity. Normal range of motion.      Cervical back: Neck supple.   Skin:     General: Skin is warm and dry.   Neurological:      Mental Status: She is alert and oriented to person, place, and time.       Physical Exam      Assessment & Plan            Assessment   Diagnoses and all orders for this visit:    1. Biliary dyskinesia (Primary)  -     Case Request; Standing  -     sodium chloride 0.9 % flush 3 mL  -     sodium chloride 0.9 % flush 10 mL  -     sodium chloride 0.9 % infusion 40 mL  -     acetaminophen (TYLENOL) tablet 650 mg  -     indocyanine green (IC-GREEN) injection 2.5 mg  -     ceFAZolin 2000 mg IVPB in 100 mL NS (VTB)  -     Case Request    Other orders  -     Follow Anesthesia Guidelines / Protocol; Future  -     Follow Anesthesia Guidelines / Protocol; Standing  -     Verify / Perform Chlorhexidine Skin Prep; Standing  -     Provide NPO Instructions to Patient; Future  -     Chlorhexidine Skin Prep; Future  -     Insert Peripheral IV; Standing  -     Saline Lock & Maintain IV Access; Standing  -     Place Sequential Compression Device; Standing  -     Maintain Sequential Compression Device; Standing        Assessment & Plan      Brea Fitzpatrick is a 45 y.o. female with small bowel Crohn's disease on Cimzia infusions currently transitioning to at home dosing after her last infusion which is tomorrow.  Will plan for cholecystectomy 3 weeks from now in between her 2 doses and she is aware of the increased risks related to her immunosuppression therapy with regard to surgical recovery.  She will undergo cholecystectomy due to her biliary dyskinesia.    BMI is >= 25 and  <30. (Overweight) The following options were offered after discussion;: weight loss educational material (shared in after visit summary)            This document has been electronically signed by Carlos Daniel MD   July 15, 2025 16:28 EDT    Patient or patient representative verbalized consent for the use of Ambient Listening during the visit with  Carlos Daniel MD for chart documentation. 7/15/2025  16:32 EDT

## 2025-07-16 ENCOUNTER — TELEPHONE (OUTPATIENT)
Dept: GASTROENTEROLOGY | Facility: CLINIC | Age: 45
End: 2025-07-16

## 2025-07-17 DIAGNOSIS — K50.019 CROHN'S DISEASE OF SMALL INTESTINE WITH COMPLICATION: Primary | ICD-10-CM

## 2025-07-17 DIAGNOSIS — R10.84 GENERALIZED ABDOMINAL PAIN: ICD-10-CM

## 2025-07-17 RX ORDER — DICYCLOMINE HYDROCHLORIDE 10 MG/1
10 CAPSULE ORAL 3 TIMES DAILY PRN
Qty: 60 CAPSULE | Refills: 5 | Status: SHIPPED | OUTPATIENT
Start: 2025-07-17

## 2025-07-31 ENCOUNTER — OFFICE VISIT (OUTPATIENT)
Dept: GASTROENTEROLOGY | Facility: CLINIC | Age: 45
End: 2025-07-31
Payer: COMMERCIAL

## 2025-07-31 VITALS
WEIGHT: 186.4 LBS | SYSTOLIC BLOOD PRESSURE: 138 MMHG | HEART RATE: 70 BPM | DIASTOLIC BLOOD PRESSURE: 92 MMHG | HEIGHT: 67 IN | BODY MASS INDEX: 29.26 KG/M2

## 2025-07-31 DIAGNOSIS — K21.9 GASTROESOPHAGEAL REFLUX DISEASE WITHOUT ESOPHAGITIS: ICD-10-CM

## 2025-07-31 DIAGNOSIS — K82.8 BILIARY DYSKINESIA: ICD-10-CM

## 2025-07-31 DIAGNOSIS — K50.019 CROHN'S DISEASE OF SMALL INTESTINE WITH COMPLICATION: Primary | ICD-10-CM

## 2025-07-31 PROCEDURE — 99214 OFFICE O/P EST MOD 30 MIN: CPT

## 2025-07-31 NOTE — PROGRESS NOTES
"Chief Complaint  Crohn's Disease    Brea Fitzpatrick is a 45 y.o. female who presents today to [facility] for Crohn's Disease.    HPI:   45-year-old female with PMH of Crohn's disease previously followed by Jaxon Schuster PA-C,  and more recently, Dr. Dumont, see documentation for prior management.  CT enterography in July 2022 showing small bowel inflammation in the distal ileum.  She had a mildly elevated calprotectin.  Prior to CT enterography, she had a ED abdomen pelvis that documented circumferential inflammation of the distal ileum.  She does not have a biopsy confirming Crohn's disease.  Patient trialed Humira in the past but felt like this was causing peripheral neuropathy.  Her first Stelara infusion was 12/8/2022.  EGD/colonoscopy was performed in June 2022 by Dr. Dumont.  This was notable for normal esophagus, small hiatal hernia, patchy mild erythematous mucosa without bleeding in the gastric antrum, normal duodenum.  Colonoscopy was notable for 4 mm polyp in the cecum, 2 sessile polyps in the sigmoid colon, internal hemorrhoids, normal terminal ileum.  Gastric antrum biopsy was notable for mild chronic gastritis without intestinal metaplasia or dysplasia.  Esophagus biopsy notable for mild chronic esophagitis with minute fragments of benign glandular.  Cecum polyp was notable for benign colonic mucosa with no definitive histopathologic abnormalities.  Sigmoid  polyps were hyperplastic x 2.      9/24/2024: Today, patient reports that she is having issues with bloating after eating.  States that after each meal she typically has immediate bloating and then within 2-3 hours she has to run to the bathroom.  She states that when she has a bowel movement it is very \"forceful and pressurized diarrhea.\"  She does not have blood, melena, or mucus in her stools.  She states that the stool is typically yellow with \"little floating pieces.\" This is very painful for " her.  He states this has been ongoing for the last 2-3 weeks.  She reports having at least 1-2 bowel movements every day that she rates as BSS 7.  She has discontinued soda and carbonated beverages and started drinking more water.  However, this is not alleviated her symptoms.  She notes chronic nausea and 1 episode of vomiting.  She states that she has indigestion in her midesophagus.  She denies dysphagia.  However, she does have a full sensation in her midesophagus.     Of note, her last Stelara injection was on August 28 and her next Stelara injection is scheduled for October 28.  Lab work was obtained.  This was notable for an elevated ESR and CRP.  Fecal calprotectin was elevated as well.  Patient was subsequently placed on 1 month steroid taper per Dr. Dumnot.  Unfortunately C. difficile of and GI panels were never collected.  Colonoscopy was performed on 10/29/2024 by Dr. Dumont.  This was notable for normal colon, internal hemorrhoids, terminal ileum was normal, mild scarring of the ileocecal valve.  Plan moving forward is to increase Stelara dosing to every 6 weeks instead of current every 8 weeks.     12/13/2024: Today, patient presents for follow-up.  She states that she has been doing better.  She is no longer having diarrhea, bloating, or abdominal pain.  She is still waiting for Stelara dose to be approved by insurance.  Otherwise, she states that she is doing well.  She notes having more frequent acid reflux.  Currently she is having acid reflux 3-4 times per week on Protonix 40 mg once per day.  On last visit patient was having issues with dysphagia, however this has resolved as well.  She has no other complaints today.  Patient's Protonix was increased to 40 mg twice daily.  Unfortunately, Stelara dosing every 6 weeks was not approved by insurance.  Patient was subsequently started on Cimzia 400 mg per Dr. Dumont.         2/7/2025: Patient presents for follow-up.  She notes that her GERD has  been better.  She is still having excessive belching.  States that she has stopped drinking Mountain Dew and now only drinks water and Sprite.  For the last few weeks, patient has had diarrhea.  She has not had but a few episodes of bloody diarrhea as well.  She denies abdominal pain.  Patient had Stelara injection most recently on 12/9/2024.  Unfortunately, insurance would not approve for this to be administered every 6 weeks.  Therefore, Dr. Dumont recommended switch to Cimzia.  Patient has had a change in insurance plan and this will no longer be covered by [facility].  Initial Cimzia dose will be administered today in office.  Subsequent doses will be administered at home.     Of note, patient was monitored in office x 30 minutes after injection.  Vitals were taken once again.  VSS.  Patient was reevaluated after 30-min.  Patient reported that she was doing fine.       5/22/2025: Today, patient presents for follow-up.  Patient has been on Cimzia since February.  She notes having ongoing diarrhea.  On good days she has 1-2 bowel movements that she rates as BSS 7.  However, on bad days patient often has 5 bowel movements that she rates as BSS 7.  Bad days-at least 2 days each week.  She has not noticed any bloody or black stools.  However, patient does have lower abdominal tenderness for the last several weeks.  Patient has noticed her GERD is poorly controlled on Protonix 40 mg twice daily.  Along with GERD, patient does note some mild nausea.  She has been having right upper quadrant pain that radiates to her back that typically is worse after eating.  No alleviating factors identified.  Patient had a gallbladder ultrasound performed on/20 1/2025 notable for no gallstones, common bile duct is prominent at 7 mm, correlate with CT abdomen with contrast to further evaluate.  CT abdomen with contrast was performed on 5/19/2025.  No acute intra-abdominal findings per CT. patient was initiated  on Prevacid 30 mg twice daily.  HIDA scan was obtained and was notable for EF of 1%.  Patient was referred to general surgery for consideration of elective cholecystectomy.  Patient was also initiated on Tremfya.  This did cause significant abdominal pain just after induction infusions.  However, patient was not interested in discontinuing.  Patient was subsequently initiated on Bentyl as needed.           Interval History:    Today, patient presents for follow-up.  Patient was started on Tremfya for Crohn's disease on last visit.  She has received 2 induction infusions.  Patient states that after the first injection infusion she had significant abdominal cramping and tenderness x 5 days.  She notes that there was a stomach virus going around work and she was concerned that she contracted this.  However, she had similar abdominal pains the night after the second infusion.  Patient was subsequently started on Bentyl 10 milligrams 3 times daily as needed.  States that abdominal cramping lasted 1-2 days after second infusion.  Patient states that pain was alleviated with Bentyl.  She notes that her bowel movements have normalized.  She has formed bowel movements daily.  Patient was also started on Prevacid 30 mg twice daily due to uncontrolled GERD and nausea.  Her GERD has been well-controlled as well as her nausea.  In the past, patient has had some esophageal dysphagia to solid food after eating.  She states that this has been greatly improved over the last 2 weeks.  She denies unintentional weight loss, nausea, vomiting, melena, hematochezia, or BRBPR.          Previous History:   Past Medical History:   Diagnosis Date    Anemia     Colon polyp     Crohn's disease 2022    Depression     Fibromyalgia     Fibromyalgia, primary     GERD (gastroesophageal reflux disease)     Hernia June 2022    Hypertension April 2025    Put on  Metoprolol ER 50 mg and has been maintained since.    IBS (irritable bowel syndrome)      "  Past Surgical History:   Procedure Laterality Date    APPENDECTOMY      COLONOSCOPY      COLONOSCOPY N/A 2022    Procedure: COLONOSCOPY FOR SCREENING;  Surgeon: Jo Lamb MD;  Location: [facility] OR;  Service: Gastroenterology;  Laterality: N/A;    COLONOSCOPY N/A 10/29/2024    Procedure: COLONOSCOPY;  Surgeon: Jo Lamb MD;  Location: [facility] OR;  Service: Gastroenterology;  Laterality: N/A;    ENDOSCOPY N/A 2022    Procedure: ESOPHAGOGASTRODUODENOSCOPY WITH BIOPSY;  Surgeon: Jo Lamb MD;  Location: [facility] OR;  Service: Gastroenterology;  Laterality: N/A;    ROTATOR CUFF REPAIR Right     TUBAL ABDOMINAL LIGATION      UPPER GASTROINTESTINAL ENDOSCOPY        Social History     Socioeconomic History    Marital status:    Tobacco Use    Smoking status: Former     Current packs/day: 0.00     Types: Cigarettes     Quit date: 2001     Years since quittin.5    Smokeless tobacco: Never   Vaping Use    Vaping status: Every Day    Substances: Nicotine, Flavoring   Substance and Sexual Activity    Alcohol use: Never    Drug use: Never    Sexual activity: Yes     Partners: Male     Birth control/protection: Tubal ligation, Surgical     Comment: Tubal        Current Medications:  Current Outpatient Medications   Medication Sig Dispense Refill    dicyclomine (BENTYL) 10 MG capsule Take 1 capsule by mouth 3 (Three) Times a Day As Needed for Abdominal Cramping. 60 capsule 5    lansoprazole (PREVACID) 30 MG capsule Take 1 capsule by mouth 2 (Two) Times a Day. 60 capsule 5    metoprolol tartrate (LOPRESSOR) 50 MG tablet Take 1 tablet by mouth 2 (Two) Times a Day.      tiZANidine (ZANAFLEX) 4 MG tablet Every 8 (Eight) Hours.       No current facility-administered medications for this visit.       Allergies:   No Known Allergies    Vitals:   /92   Pulse 70   Ht 170.2 cm (67\")   Wt 84.6 kg (186 lb 6.4 oz)   BMI 29.19 kg/m²   Estimated body mass index " "is 29.19 kg/m² as calculated from the following:    Height as of this encounter: 170.2 cm (67\").    Weight as of this encounter: 84.6 kg (186 lb 6.4 oz).    ROS:   Review of Systems   Constitutional: Negative.    HENT: Negative.     Respiratory: Negative.     Cardiovascular: Negative.    Gastrointestinal:  Positive for abdominal pain. Negative for abdominal distention, anal bleeding, blood in stool, constipation, diarrhea, nausea, rectal pain and vomiting.   All other systems reviewed and are negative.       Physical Exam:   Physical Exam  Vitals reviewed.   Constitutional:       General: She is not in acute distress.     Appearance: She is well-groomed. She is not toxic-appearing.   HENT:      Head: Normocephalic and atraumatic.      Mouth/Throat:      Mouth: Mucous membranes are moist.   Eyes:      Extraocular Movements: Extraocular movements intact.   Cardiovascular:      Rate and Rhythm: Normal rate and regular rhythm.      Heart sounds: Normal heart sounds. No murmur heard.  Pulmonary:      Effort: Pulmonary effort is normal. No respiratory distress.      Breath sounds: Normal breath sounds. No stridor. No wheezing or rales.   Abdominal:      General: Bowel sounds are normal. There is no distension.      Palpations: Abdomen is soft. There is no mass.      Tenderness: There is no abdominal tenderness. There is no guarding or rebound.      Hernia: No hernia is present.   Skin:     General: Skin is warm.      Coloration: Skin is not jaundiced.      Findings: No rash.   Neurological:      Mental Status: She is alert and oriented to person, place, and time.   Psychiatric:         Mood and Affect: Mood and affect normal.         Speech: Speech normal.         Behavior: Behavior normal. Behavior is cooperative.         Thought Content: Thought content normal.          Lab Results:   Lab Results   Component Value Date    WBC 6.61 02/07/2025    HGB 12.4 02/07/2025    HCT 37.6 02/07/2025    MCV 91.7 02/07/2025    RDW " 12.2 (L) 02/07/2025     02/07/2025    NEUTRORELPCT 69.1 02/07/2025    LYMPHORELPCT 23.6 02/07/2025    MONORELPCT 5.4 02/07/2025    EOSRELPCT 0.8 02/07/2025    BASORELPCT 0.3 02/07/2025    NEUTROABS 4.57 02/07/2025    LYMPHSABS 1.56 02/07/2025       Lab Results   Component Value Date     02/07/2025    K 3.7 02/07/2025    CO2 26.0 02/07/2025     02/07/2025    BUN 10 02/07/2025    CREATININE 0.76 02/07/2025    GLUCOSE 94 02/07/2025    CALCIUM 9.2 02/07/2025    ALKPHOS 86 02/07/2025    AST 11 02/07/2025    ALT 8 02/07/2025    BILITOT <0.2 02/07/2025    ALBUMIN 3.5 02/07/2025    PROTEINTOT 6.6 02/07/2025       Pathology:        Endoscopy:        Imaging:       Results review: During today's encounter, all relevant clinical data has been reviewed.      Assessment and Plan    1. Crohn's disease of small intestine with complication (Primary)  Currently well-controlled on Tremfya  Patient notes some abdominal pain and tenderness after Tremfya injection.  Please use Bentyl as needed for abdominal pain.    2. Gastroesophageal reflux disease without esophagitis  Discussed management for GERD: encouraged weight loss, HOB elevation at night, avoidance of meals 2-3 hours before bedtime, avoid trigger foods (caffeine, alcohol, chocolate, acidic/spicy foods e.g oranges/tomatoes), and encouraged smoking cessation  Currently well-controlled on Prevacid 30 mg twice daily.    3. Biliary dyskinesia  Patient has appointment with general surgery in August for cholecystectomy.    New Medications:   No orders of the defined types were placed in this encounter.      Discontinued Medications:   Medications Discontinued During This Encounter   Medication Reason    Certolizumab Pegol (Cimzia, 2 Syringe,) 200 MG/ML Prefilled Syringe Kit injection *Therapy completed        Visit Diagnoses:    ICD-10-CM ICD-9-CM   1. Crohn's disease of small intestine with complication  K50.019 555.0   2. Gastroesophageal reflux disease without  esophagitis  K21.9 530.81   3. Biliary dyskinesia  K82.8 575.8            Follow Up:   Return in about 3 months (around 10/31/2025).    The patient was in agreement with the plan and all questions were answered to patient's satisfaction.        This document has been electronically signed by Nettie Lilly PA-C   July 31, 2025 08:36 EDT    Dictated Utilizing Dragon Dictation: Part of this note may be an electronic transcription/translation of spoken language to printed text using the Dragon Dictation System.    CC:  No ref. provider found  Jesus Roque, APRN

## 2025-08-12 ENCOUNTER — INFUSION (OUTPATIENT)
Dept: ONCOLOGY | Facility: HOSPITAL | Age: 45
End: 2025-08-12
Payer: COMMERCIAL

## 2025-08-12 ENCOUNTER — PRE-ADMISSION TESTING (OUTPATIENT)
Dept: PREADMISSION TESTING | Facility: HOSPITAL | Age: 45
End: 2025-08-12
Payer: COMMERCIAL

## 2025-08-12 VITALS
RESPIRATION RATE: 18 BRPM | OXYGEN SATURATION: 100 % | HEART RATE: 56 BPM | TEMPERATURE: 98.1 F | DIASTOLIC BLOOD PRESSURE: 79 MMHG | SYSTOLIC BLOOD PRESSURE: 138 MMHG

## 2025-08-12 DIAGNOSIS — K50.019 CROHN'S DISEASE OF SMALL INTESTINE WITH COMPLICATION: Primary | ICD-10-CM

## 2025-08-12 LAB
ANION GAP SERPL CALCULATED.3IONS-SCNC: 10 MMOL/L (ref 5–15)
BUN SERPL-MCNC: 7.8 MG/DL (ref 6–20)
BUN/CREAT SERPL: 11.6 (ref 7–25)
CALCIUM SPEC-SCNC: 8.9 MG/DL (ref 8.6–10.5)
CHLORIDE SERPL-SCNC: 107 MMOL/L (ref 98–107)
CO2 SERPL-SCNC: 25 MMOL/L (ref 22–29)
CREAT SERPL-MCNC: 0.67 MG/DL (ref 0.57–1)
DEPRECATED RDW RBC AUTO: 40 FL (ref 37–54)
EGFRCR SERPLBLD CKD-EPI 2021: 111.4 ML/MIN/1.73
ERYTHROCYTE [DISTWIDTH] IN BLOOD BY AUTOMATED COUNT: 11.9 % (ref 12.3–15.4)
GLUCOSE SERPL-MCNC: 92 MG/DL (ref 65–99)
HCT VFR BLD AUTO: 36.7 % (ref 34–46.6)
HGB BLD-MCNC: 12.5 G/DL (ref 12–15.9)
MCH RBC QN AUTO: 31.3 PG (ref 26.6–33)
MCHC RBC AUTO-ENTMCNC: 34.1 G/DL (ref 31.5–35.7)
MCV RBC AUTO: 92 FL (ref 79–97)
PLATELET # BLD AUTO: 281 10*3/MM3 (ref 140–450)
PMV BLD AUTO: 8.9 FL (ref 6–12)
POTASSIUM SERPL-SCNC: 3.6 MMOL/L (ref 3.5–5.2)
RBC # BLD AUTO: 3.99 10*6/MM3 (ref 3.77–5.28)
SODIUM SERPL-SCNC: 142 MMOL/L (ref 136–145)
WBC NRBC COR # BLD AUTO: 6.94 10*3/MM3 (ref 3.4–10.8)

## 2025-08-12 PROCEDURE — 96365 THER/PROPH/DIAG IV INF INIT: CPT

## 2025-08-12 PROCEDURE — 80048 BASIC METABOLIC PNL TOTAL CA: CPT

## 2025-08-12 PROCEDURE — 25810000003 SODIUM CHLORIDE 0.9 % SOLUTION 250 ML FLEX CONT

## 2025-08-12 PROCEDURE — 36415 COLL VENOUS BLD VENIPUNCTURE: CPT

## 2025-08-12 PROCEDURE — 25010000002 GUSELKUMAB 200 MG/20ML SOLUTION 20 ML VIAL

## 2025-08-12 PROCEDURE — 85027 COMPLETE CBC AUTOMATED: CPT

## 2025-08-12 RX ORDER — HYDROCORTISONE SODIUM SUCCINATE 100 MG/2ML
100 INJECTION INTRAMUSCULAR; INTRAVENOUS AS NEEDED
OUTPATIENT
Start: 2025-08-12

## 2025-08-12 RX ORDER — FAMOTIDINE 10 MG/ML
20 INJECTION, SOLUTION INTRAVENOUS AS NEEDED
OUTPATIENT
Start: 2025-08-12

## 2025-08-12 RX ORDER — FOLIC ACID 1 MG/1
1 TABLET ORAL DAILY
COMMUNITY

## 2025-08-12 RX ORDER — DIPHENHYDRAMINE HYDROCHLORIDE 50 MG/ML
50 INJECTION, SOLUTION INTRAMUSCULAR; INTRAVENOUS AS NEEDED
OUTPATIENT
Start: 2025-08-12

## 2025-08-12 RX ORDER — IBUPROFEN 400 MG/1
400 TABLET, FILM COATED ORAL EVERY 6 HOURS PRN
COMMUNITY

## 2025-08-12 RX ADMIN — SODIUM CHLORIDE 200 MG: 9 INJECTION, SOLUTION INTRAVENOUS at 13:54

## 2025-08-29 ENCOUNTER — APPOINTMENT (OUTPATIENT)
Dept: GENERAL RADIOLOGY | Facility: HOSPITAL | Age: 45
End: 2025-08-29
Payer: COMMERCIAL

## 2025-08-29 ENCOUNTER — HOSPITAL ENCOUNTER (OUTPATIENT)
Facility: HOSPITAL | Age: 45
Setting detail: HOSPITAL OUTPATIENT SURGERY
Discharge: HOME | End: 2025-08-29
Attending: SURGERY | Admitting: SURGERY
Payer: COMMERCIAL

## 2025-08-29 ENCOUNTER — ANESTHESIA EVENT (OUTPATIENT)
Dept: PERIOP | Facility: HOSPITAL | Age: 45
End: 2025-08-29
Payer: COMMERCIAL

## 2025-08-29 ENCOUNTER — ANESTHESIA (OUTPATIENT)
Dept: PERIOP | Facility: HOSPITAL | Age: 45
End: 2025-08-29
Payer: COMMERCIAL

## 2025-08-29 VITALS
BODY MASS INDEX: 28.56 KG/M2 | HEIGHT: 67 IN | TEMPERATURE: 97.9 F | HEART RATE: 58 BPM | RESPIRATION RATE: 18 BRPM | SYSTOLIC BLOOD PRESSURE: 158 MMHG | OXYGEN SATURATION: 97 % | DIASTOLIC BLOOD PRESSURE: 74 MMHG | WEIGHT: 182 LBS

## 2025-08-29 DIAGNOSIS — K82.8 BILIARY DYSKINESIA: ICD-10-CM

## 2025-08-29 PROCEDURE — 25010000002 GLYCOPYRROLATE 0.4 MG/2ML SOLUTION: Performed by: NURSE ANESTHETIST, CERTIFIED REGISTERED

## 2025-08-29 PROCEDURE — 25010000002 MIDAZOLAM PER 1 MG: Performed by: NURSE ANESTHETIST, CERTIFIED REGISTERED

## 2025-08-29 PROCEDURE — 25010000002 LIDOCAINE PF 2% 2 % SOLUTION: Performed by: NURSE ANESTHETIST, CERTIFIED REGISTERED

## 2025-08-29 PROCEDURE — 25010000002 INDOCYANINE GREEN 25 MG RECONSTITUTED SOLUTION: Performed by: SURGERY

## 2025-08-29 PROCEDURE — 25010000002 KETOROLAC TROMETHAMINE PER 15 MG: Performed by: NURSE ANESTHETIST, CERTIFIED REGISTERED

## 2025-08-29 PROCEDURE — 25010000002 ONDANSETRON PER 1 MG: Performed by: NURSE ANESTHETIST, CERTIFIED REGISTERED

## 2025-08-29 PROCEDURE — 25810000003 LACTATED RINGERS PER 1000 ML: Performed by: ANESTHESIOLOGY

## 2025-08-29 PROCEDURE — 25010000002 CEFAZOLIN PER 500 MG: Performed by: SURGERY

## 2025-08-29 PROCEDURE — 25010000002 FAMOTIDINE 10 MG/ML SOLUTION: Performed by: NURSE ANESTHETIST, CERTIFIED REGISTERED

## 2025-08-29 PROCEDURE — 25010000002 HYDROMORPHONE 1 MG/ML SOLUTION: Performed by: NURSE ANESTHETIST, CERTIFIED REGISTERED

## 2025-08-29 PROCEDURE — 25010000002 BUPRENORPHINE PER 0.1 MG: Performed by: ANESTHESIOLOGY

## 2025-08-29 PROCEDURE — 25010000002 DROPERIDOL PER 5 MG: Performed by: ANESTHESIOLOGY

## 2025-08-29 PROCEDURE — 81025 URINE PREGNANCY TEST: CPT | Performed by: ANESTHESIOLOGY

## 2025-08-29 PROCEDURE — 25010000002 FENTANYL CITRATE (PF) 50 MCG/ML SOLUTION: Performed by: NURSE ANESTHETIST, CERTIFIED REGISTERED

## 2025-08-29 PROCEDURE — 25010000002 DEXAMETHASONE PER 1 MG: Performed by: NURSE ANESTHETIST, CERTIFIED REGISTERED

## 2025-08-29 PROCEDURE — 25010000002 ROPIVACAINE PER 1 MG: Performed by: ANESTHESIOLOGY

## 2025-08-29 PROCEDURE — 25010000002 NEOSTIGMINE 10 MG/10ML SOLUTION: Performed by: NURSE ANESTHETIST, CERTIFIED REGISTERED

## 2025-08-29 PROCEDURE — 25010000002 PROPOFOL 10 MG/ML EMULSION: Performed by: NURSE ANESTHETIST, CERTIFIED REGISTERED

## 2025-08-29 RX ORDER — DEXAMETHASONE SODIUM PHOSPHATE 4 MG/ML
INJECTION, SOLUTION INTRA-ARTICULAR; INTRALESIONAL; INTRAMUSCULAR; INTRAVENOUS; SOFT TISSUE
Status: COMPLETED | OUTPATIENT
Start: 2025-08-29 | End: 2025-08-29

## 2025-08-29 RX ORDER — BUPRENORPHINE HYDROCHLORIDE 0.32 MG/ML
INJECTION INTRAMUSCULAR; INTRAVENOUS
Status: COMPLETED | OUTPATIENT
Start: 2025-08-29 | End: 2025-08-29

## 2025-08-29 RX ORDER — ONDANSETRON 2 MG/ML
4 INJECTION INTRAMUSCULAR; INTRAVENOUS AS NEEDED
Status: DISCONTINUED | OUTPATIENT
Start: 2025-08-29 | End: 2025-08-29 | Stop reason: HOSPADM

## 2025-08-29 RX ORDER — ONDANSETRON 2 MG/ML
INJECTION INTRAMUSCULAR; INTRAVENOUS AS NEEDED
Status: DISCONTINUED | OUTPATIENT
Start: 2025-08-29 | End: 2025-08-29 | Stop reason: SURG

## 2025-08-29 RX ORDER — SODIUM CHLORIDE 9 MG/ML
40 INJECTION, SOLUTION INTRAVENOUS AS NEEDED
Status: DISCONTINUED | OUTPATIENT
Start: 2025-08-29 | End: 2025-08-29 | Stop reason: HOSPADM

## 2025-08-29 RX ORDER — MIDAZOLAM HYDROCHLORIDE 1 MG/ML
INJECTION, SOLUTION INTRAMUSCULAR; INTRAVENOUS AS NEEDED
Status: DISCONTINUED | OUTPATIENT
Start: 2025-08-29 | End: 2025-08-29 | Stop reason: SURG

## 2025-08-29 RX ORDER — ACETAMINOPHEN 325 MG/1
650 TABLET ORAL ONCE
Status: COMPLETED | OUTPATIENT
Start: 2025-08-29 | End: 2025-08-29

## 2025-08-29 RX ORDER — ROCURONIUM BROMIDE 10 MG/ML
INJECTION, SOLUTION INTRAVENOUS AS NEEDED
Status: DISCONTINUED | OUTPATIENT
Start: 2025-08-29 | End: 2025-08-29 | Stop reason: SURG

## 2025-08-29 RX ORDER — PROPOFOL 10 MG/ML
VIAL (ML) INTRAVENOUS AS NEEDED
Status: DISCONTINUED | OUTPATIENT
Start: 2025-08-29 | End: 2025-08-29 | Stop reason: SURG

## 2025-08-29 RX ORDER — SODIUM CHLORIDE 0.9 % (FLUSH) 0.9 %
10 SYRINGE (ML) INJECTION AS NEEDED
Status: DISCONTINUED | OUTPATIENT
Start: 2025-08-29 | End: 2025-08-29 | Stop reason: HOSPADM

## 2025-08-29 RX ORDER — ONDANSETRON 2 MG/ML
4 INJECTION INTRAMUSCULAR; INTRAVENOUS AS NEEDED
Status: CANCELLED | OUTPATIENT
Start: 2025-08-29

## 2025-08-29 RX ORDER — MEPERIDINE HYDROCHLORIDE 25 MG/ML
12.5 INJECTION INTRAMUSCULAR; INTRAVENOUS; SUBCUTANEOUS
Status: DISCONTINUED | OUTPATIENT
Start: 2025-08-29 | End: 2025-08-29 | Stop reason: HOSPADM

## 2025-08-29 RX ORDER — IPRATROPIUM BROMIDE AND ALBUTEROL SULFATE 2.5; .5 MG/3ML; MG/3ML
3 SOLUTION RESPIRATORY (INHALATION) ONCE AS NEEDED
Status: CANCELLED | OUTPATIENT
Start: 2025-08-29

## 2025-08-29 RX ORDER — FENTANYL CITRATE 50 UG/ML
INJECTION, SOLUTION INTRAMUSCULAR; INTRAVENOUS AS NEEDED
Status: DISCONTINUED | OUTPATIENT
Start: 2025-08-29 | End: 2025-08-29 | Stop reason: SURG

## 2025-08-29 RX ORDER — OXYCODONE AND ACETAMINOPHEN 5; 325 MG/1; MG/1
1 TABLET ORAL ONCE AS NEEDED
Refills: 0 | Status: CANCELLED | OUTPATIENT
Start: 2025-08-29

## 2025-08-29 RX ORDER — GLYCOPYRROLATE 0.2 MG/ML
INJECTION INTRAMUSCULAR; INTRAVENOUS AS NEEDED
Status: DISCONTINUED | OUTPATIENT
Start: 2025-08-29 | End: 2025-08-29 | Stop reason: SURG

## 2025-08-29 RX ORDER — ACETAMINOPHEN 325 MG/1
650 TABLET ORAL EVERY 4 HOURS PRN
Qty: 30 TABLET | Refills: 0 | Status: SHIPPED | OUTPATIENT
Start: 2025-08-29

## 2025-08-29 RX ORDER — SODIUM CHLORIDE 0.9 % (FLUSH) 0.9 %
10 SYRINGE (ML) INJECTION EVERY 12 HOURS SCHEDULED
Status: DISCONTINUED | OUTPATIENT
Start: 2025-08-29 | End: 2025-08-29 | Stop reason: HOSPADM

## 2025-08-29 RX ORDER — KETOROLAC TROMETHAMINE 30 MG/ML
INJECTION, SOLUTION INTRAMUSCULAR; INTRAVENOUS AS NEEDED
Status: DISCONTINUED | OUTPATIENT
Start: 2025-08-29 | End: 2025-08-29 | Stop reason: SURG

## 2025-08-29 RX ORDER — FENTANYL CITRATE 50 UG/ML
50 INJECTION, SOLUTION INTRAMUSCULAR; INTRAVENOUS
Status: DISCONTINUED | OUTPATIENT
Start: 2025-08-29 | End: 2025-08-29 | Stop reason: HOSPADM

## 2025-08-29 RX ORDER — SODIUM CHLORIDE 0.9 % (FLUSH) 0.9 %
3 SYRINGE (ML) INJECTION EVERY 12 HOURS SCHEDULED
Status: DISCONTINUED | OUTPATIENT
Start: 2025-08-29 | End: 2025-08-29 | Stop reason: HOSPADM

## 2025-08-29 RX ORDER — DEXAMETHASONE SODIUM PHOSPHATE 4 MG/ML
INJECTION, SOLUTION INTRA-ARTICULAR; INTRALESIONAL; INTRAMUSCULAR; INTRAVENOUS; SOFT TISSUE AS NEEDED
Status: DISCONTINUED | OUTPATIENT
Start: 2025-08-29 | End: 2025-08-29 | Stop reason: SURG

## 2025-08-29 RX ORDER — DROPERIDOL 2.5 MG/ML
0.62 INJECTION, SOLUTION INTRAMUSCULAR; INTRAVENOUS ONCE
Status: COMPLETED | OUTPATIENT
Start: 2025-08-29 | End: 2025-08-29

## 2025-08-29 RX ORDER — FENTANYL CITRATE 50 UG/ML
50 INJECTION, SOLUTION INTRAMUSCULAR; INTRAVENOUS
Status: CANCELLED | OUTPATIENT
Start: 2025-08-29

## 2025-08-29 RX ORDER — ROPIVACAINE HYDROCHLORIDE 5 MG/ML
INJECTION, SOLUTION EPIDURAL; INFILTRATION; PERINEURAL
Status: COMPLETED | OUTPATIENT
Start: 2025-08-29 | End: 2025-08-29

## 2025-08-29 RX ORDER — FAMOTIDINE 10 MG/ML
INJECTION, SOLUTION INTRAVENOUS AS NEEDED
Status: DISCONTINUED | OUTPATIENT
Start: 2025-08-29 | End: 2025-08-29 | Stop reason: SURG

## 2025-08-29 RX ORDER — OXYCODONE AND ACETAMINOPHEN 5; 325 MG/1; MG/1
1 TABLET ORAL ONCE AS NEEDED
Status: DISCONTINUED | OUTPATIENT
Start: 2025-08-29 | End: 2025-08-29 | Stop reason: HOSPADM

## 2025-08-29 RX ORDER — SODIUM CHLORIDE, SODIUM LACTATE, POTASSIUM CHLORIDE, CALCIUM CHLORIDE 600; 310; 30; 20 MG/100ML; MG/100ML; MG/100ML; MG/100ML
100 INJECTION, SOLUTION INTRAVENOUS ONCE AS NEEDED
Status: CANCELLED | OUTPATIENT
Start: 2025-08-29 | End: 2025-08-29

## 2025-08-29 RX ORDER — IBUPROFEN 600 MG/1
600 TABLET, FILM COATED ORAL EVERY 6 HOURS PRN
Qty: 30 TABLET | Refills: 0 | Status: SHIPPED | OUTPATIENT
Start: 2025-08-29

## 2025-08-29 RX ORDER — SODIUM CHLORIDE, SODIUM LACTATE, POTASSIUM CHLORIDE, CALCIUM CHLORIDE 600; 310; 30; 20 MG/100ML; MG/100ML; MG/100ML; MG/100ML
125 INJECTION, SOLUTION INTRAVENOUS ONCE
Status: COMPLETED | OUTPATIENT
Start: 2025-08-29 | End: 2025-08-29

## 2025-08-29 RX ORDER — MIDAZOLAM HYDROCHLORIDE 1 MG/ML
1 INJECTION, SOLUTION INTRAMUSCULAR; INTRAVENOUS
Status: DISCONTINUED | OUTPATIENT
Start: 2025-08-29 | End: 2025-08-29 | Stop reason: HOSPADM

## 2025-08-29 RX ORDER — LIDOCAINE HYDROCHLORIDE 20 MG/ML
INJECTION, SOLUTION EPIDURAL; INFILTRATION; INTRACAUDAL; PERINEURAL AS NEEDED
Status: DISCONTINUED | OUTPATIENT
Start: 2025-08-29 | End: 2025-08-29 | Stop reason: SURG

## 2025-08-29 RX ORDER — SODIUM CHLORIDE, SODIUM LACTATE, POTASSIUM CHLORIDE, CALCIUM CHLORIDE 600; 310; 30; 20 MG/100ML; MG/100ML; MG/100ML; MG/100ML
100 INJECTION, SOLUTION INTRAVENOUS ONCE AS NEEDED
Status: DISCONTINUED | OUTPATIENT
Start: 2025-08-29 | End: 2025-08-29 | Stop reason: HOSPADM

## 2025-08-29 RX ORDER — MEPERIDINE HYDROCHLORIDE 25 MG/ML
12.5 INJECTION INTRAMUSCULAR; INTRAVENOUS; SUBCUTANEOUS
Status: CANCELLED | OUTPATIENT
Start: 2025-08-29 | End: 2025-08-30

## 2025-08-29 RX ORDER — IPRATROPIUM BROMIDE AND ALBUTEROL SULFATE 2.5; .5 MG/3ML; MG/3ML
3 SOLUTION RESPIRATORY (INHALATION) ONCE AS NEEDED
Status: DISCONTINUED | OUTPATIENT
Start: 2025-08-29 | End: 2025-08-29 | Stop reason: HOSPADM

## 2025-08-29 RX ORDER — TRAMADOL HYDROCHLORIDE 50 MG/1
50 TABLET ORAL EVERY 6 HOURS PRN
Qty: 12 TABLET | Refills: 0 | Status: SHIPPED | OUTPATIENT
Start: 2025-08-29

## 2025-08-29 RX ORDER — INDOCYANINE GREEN AND WATER 25 MG
2.5 KIT INJECTION ONCE
Status: COMPLETED | OUTPATIENT
Start: 2025-08-29 | End: 2025-08-29

## 2025-08-29 RX ORDER — NEOSTIGMINE METHYLSULFATE 1 MG/ML
INJECTION INTRAVENOUS AS NEEDED
Status: DISCONTINUED | OUTPATIENT
Start: 2025-08-29 | End: 2025-08-29 | Stop reason: SURG

## 2025-08-29 RX ORDER — KETOROLAC TROMETHAMINE 30 MG/ML
30 INJECTION, SOLUTION INTRAMUSCULAR; INTRAVENOUS EVERY 6 HOURS PRN
Status: CANCELLED | OUTPATIENT
Start: 2025-08-29 | End: 2025-09-01

## 2025-08-29 RX ADMIN — HYDROMORPHONE HYDROCHLORIDE 1 MG: 1 INJECTION, SOLUTION INTRAMUSCULAR; INTRAVENOUS; SUBCUTANEOUS at 14:42

## 2025-08-29 RX ADMIN — SODIUM CHLORIDE, POTASSIUM CHLORIDE, SODIUM LACTATE AND CALCIUM CHLORIDE: 600; 310; 30; 20 INJECTION, SOLUTION INTRAVENOUS at 13:06

## 2025-08-29 RX ADMIN — DROPERIDOL 0.62 MG: 2.5 INJECTION, SOLUTION INTRAMUSCULAR; INTRAVENOUS at 14:45

## 2025-08-29 RX ADMIN — LIDOCAINE HYDROCHLORIDE 80 MG: 20 INJECTION, SOLUTION EPIDURAL; INFILTRATION; INTRACAUDAL; PERINEURAL at 13:10

## 2025-08-29 RX ADMIN — ROCURONIUM BROMIDE 25 MG: 10 INJECTION INTRAVENOUS at 13:11

## 2025-08-29 RX ADMIN — SODIUM CHLORIDE, POTASSIUM CHLORIDE, SODIUM LACTATE AND CALCIUM CHLORIDE: 600; 310; 30; 20 INJECTION, SOLUTION INTRAVENOUS at 13:52

## 2025-08-29 RX ADMIN — ONDANSETRON 4 MG: 2 INJECTION, SOLUTION INTRAMUSCULAR; INTRAVENOUS at 14:17

## 2025-08-29 RX ADMIN — DEXAMETHASONE SODIUM PHOSPHATE 4 MG: 4 INJECTION, SOLUTION INTRA-ARTICULAR; INTRALESIONAL; INTRAMUSCULAR; INTRAVENOUS; SOFT TISSUE at 13:20

## 2025-08-29 RX ADMIN — ACETAMINOPHEN 650 MG: 325 TABLET, FILM COATED ORAL at 12:32

## 2025-08-29 RX ADMIN — ROPIVACAINE HYDROCHLORIDE 240 MG: 5 INJECTION, SOLUTION EPIDURAL; INFILTRATION; PERINEURAL at 13:20

## 2025-08-29 RX ADMIN — PROPOFOL 150 MG: 10 INJECTION, EMULSION INTRAVENOUS at 13:10

## 2025-08-29 RX ADMIN — FENTANYL CITRATE 50 MCG: 50 INJECTION, SOLUTION INTRAMUSCULAR; INTRAVENOUS at 13:06

## 2025-08-29 RX ADMIN — CEFAZOLIN 2000 MG: 2 INJECTION, POWDER, FOR SOLUTION INTRAMUSCULAR; INTRAVENOUS at 13:15

## 2025-08-29 RX ADMIN — KETOROLAC TROMETHAMINE 30 MG: 30 INJECTION, SOLUTION INTRAMUSCULAR at 13:51

## 2025-08-29 RX ADMIN — MIDAZOLAM 2 MG: 1 INJECTION INTRAMUSCULAR; INTRAVENOUS at 13:06

## 2025-08-29 RX ADMIN — DEXAMETHASONE SODIUM PHOSPHATE 8 MG: 4 INJECTION, SOLUTION INTRA-ARTICULAR; INTRALESIONAL; INTRAMUSCULAR; INTRAVENOUS; SOFT TISSUE at 13:20

## 2025-08-29 RX ADMIN — FENTANYL CITRATE 50 MCG: 50 INJECTION, SOLUTION INTRAMUSCULAR; INTRAVENOUS at 14:38

## 2025-08-29 RX ADMIN — FENTANYL CITRATE 50 MCG: 50 INJECTION, SOLUTION INTRAMUSCULAR; INTRAVENOUS at 13:33

## 2025-08-29 RX ADMIN — INDOCYANINE GREEN AND WATER 2.5 MG: KIT at 12:32

## 2025-08-29 RX ADMIN — HYDROMORPHONE HYDROCHLORIDE 1 MG: 1 INJECTION, SOLUTION INTRAMUSCULAR; INTRAVENOUS; SUBCUTANEOUS at 14:58

## 2025-08-29 RX ADMIN — GLYCOPYRROLATE 0.4 MG: 0.2 INJECTION INTRAMUSCULAR; INTRAVENOUS at 13:51

## 2025-08-29 RX ADMIN — BUPRENORPHINE HYDROCHLORIDE 0.3 MG: 0.32 INJECTION INTRAMUSCULAR; INTRAVENOUS at 13:20

## 2025-08-29 RX ADMIN — FAMOTIDINE 20 MG: 10 INJECTION INTRAVENOUS at 13:06

## 2025-08-29 RX ADMIN — ONDANSETRON 4 MG: 2 INJECTION, SOLUTION INTRAMUSCULAR; INTRAVENOUS at 13:06

## 2025-08-29 RX ADMIN — NEOSTIGMINE METHYLSULFATE 3.5 MG: 1 INJECTION INTRAVENOUS at 13:51

## (undated) DEVICE — SYR LUERLOK 30CC

## (undated) DEVICE — POLYP TRAP: Brand: TRAPEASE®

## (undated) DEVICE — CONN Y IRR DISP 1P/U

## (undated) DEVICE — Device: Brand: DEFENDO AIR/WATER/SUCTION AND BIOPSY VALVE

## (undated) DEVICE — GOWN,REINF,POLY,ECL,PP SLV,XL: Brand: MEDLINE

## (undated) DEVICE — THE BITE BLOCK MAXI, LATEX FREE STRAP IS USED TO PROTECT THE ENDOSCOPE INSERTION TUBE FROM BEING BITTEN BY THE PATIENT.

## (undated) DEVICE — ENDOGATOR AUXILIARY WATER JET CONNECTOR: Brand: ENDOGATOR

## (undated) DEVICE — Device

## (undated) DEVICE — SINGLE PORT MANIFOLD: Brand: NEPTUNE 2

## (undated) DEVICE — TUBING, SUCTION, 1/4" X 20', STRAIGHT: Brand: MEDLINE INDUSTRIES, INC.

## (undated) DEVICE — SNAR POLYP CAPTIFLX MICRO OVL 13MM 240CM

## (undated) DEVICE — FRCP BX RADJAW4 NDL 2.8 240CM LG OG BX40

## (undated) DEVICE — ENDOGATOR TUBING FOR ENDOGATOR EGP-100 IRRIGATION PUMP,OLYMPUS OFP PUMP, OLYMPUS AFU-100 PUMP AND ERBE EIP2 PUMP: Brand: ENDOGATOR